# Patient Record
Sex: FEMALE | Race: WHITE | NOT HISPANIC OR LATINO | Employment: FULL TIME | URBAN - METROPOLITAN AREA
[De-identification: names, ages, dates, MRNs, and addresses within clinical notes are randomized per-mention and may not be internally consistent; named-entity substitution may affect disease eponyms.]

---

## 2017-01-12 ENCOUNTER — OFFICE VISIT - HEALTHEAST (OUTPATIENT)
Dept: FAMILY MEDICINE | Facility: CLINIC | Age: 37
End: 2017-01-12

## 2017-01-12 ENCOUNTER — COMMUNICATION - HEALTHEAST (OUTPATIENT)
Dept: FAMILY MEDICINE | Facility: CLINIC | Age: 37
End: 2017-01-12

## 2017-01-12 DIAGNOSIS — F32.2 MAJOR DEPRESSIVE DISORDER, SINGLE EPISODE, SEVERE WITHOUT PSYCHOSIS (H): ICD-10-CM

## 2017-01-12 DIAGNOSIS — F10.10 ALCOHOL ABUSE: ICD-10-CM

## 2017-01-12 DIAGNOSIS — F41.1 GENERALIZED ANXIETY DISORDER: ICD-10-CM

## 2017-01-12 DIAGNOSIS — M77.8 TENDONITIS OF BOTH WRISTS: ICD-10-CM

## 2017-01-12 DIAGNOSIS — R45.851 PASSIVE SUICIDAL IDEATIONS: ICD-10-CM

## 2017-01-12 DIAGNOSIS — F41.0 PANIC DISORDER WITHOUT AGORAPHOBIA: ICD-10-CM

## 2017-01-12 DIAGNOSIS — E11.9 TYPE 2 DIABETES MELLITUS (H): ICD-10-CM

## 2017-01-12 LAB — HBA1C MFR BLD: 6 % (ref 3.5–6)

## 2017-01-12 ASSESSMENT — MIFFLIN-ST. JEOR: SCORE: 1713.89

## 2017-02-09 ENCOUNTER — OFFICE VISIT - HEALTHEAST (OUTPATIENT)
Dept: FAMILY MEDICINE | Facility: CLINIC | Age: 37
End: 2017-02-09

## 2017-02-09 ENCOUNTER — RECORDS - HEALTHEAST (OUTPATIENT)
Dept: GENERAL RADIOLOGY | Facility: CLINIC | Age: 37
End: 2017-02-09

## 2017-02-09 DIAGNOSIS — F41.1 GENERALIZED ANXIETY DISORDER: ICD-10-CM

## 2017-02-09 DIAGNOSIS — F10.10 ALCOHOL ABUSE: ICD-10-CM

## 2017-02-09 DIAGNOSIS — M25.532 PAIN IN LEFT WRIST: ICD-10-CM

## 2017-02-09 DIAGNOSIS — M25.532 LEFT WRIST PAIN: ICD-10-CM

## 2017-02-09 ASSESSMENT — MIFFLIN-ST. JEOR: SCORE: 1715.7

## 2017-04-10 ENCOUNTER — OFFICE VISIT - HEALTHEAST (OUTPATIENT)
Dept: FAMILY MEDICINE | Facility: CLINIC | Age: 37
End: 2017-04-10

## 2017-04-10 ENCOUNTER — COMMUNICATION - HEALTHEAST (OUTPATIENT)
Dept: FAMILY MEDICINE | Facility: CLINIC | Age: 37
End: 2017-04-10

## 2017-04-10 DIAGNOSIS — F41.1 GENERALIZED ANXIETY DISORDER: ICD-10-CM

## 2017-04-10 DIAGNOSIS — F10.10 ALCOHOL ABUSE: ICD-10-CM

## 2017-04-10 ASSESSMENT — MIFFLIN-ST. JEOR: SCORE: 1752.44

## 2017-05-02 ENCOUNTER — COMMUNICATION - HEALTHEAST (OUTPATIENT)
Dept: FAMILY MEDICINE | Facility: CLINIC | Age: 37
End: 2017-05-02

## 2017-07-03 ENCOUNTER — OFFICE VISIT - HEALTHEAST (OUTPATIENT)
Dept: FAMILY MEDICINE | Facility: CLINIC | Age: 37
End: 2017-07-03

## 2017-07-03 DIAGNOSIS — F10.10 ALCOHOL ABUSE: ICD-10-CM

## 2017-07-03 DIAGNOSIS — Z00.00 ROUTINE GENERAL MEDICAL EXAMINATION AT A HEALTH CARE FACILITY: ICD-10-CM

## 2017-07-03 DIAGNOSIS — E66.9 OBESITY, UNSPECIFIED: ICD-10-CM

## 2017-07-03 DIAGNOSIS — E11.9 TYPE 2 DIABETES MELLITUS (H): ICD-10-CM

## 2017-07-03 DIAGNOSIS — E28.2 POLYCYSTIC OVARIES: ICD-10-CM

## 2017-07-03 DIAGNOSIS — F41.1 GENERALIZED ANXIETY DISORDER: ICD-10-CM

## 2017-07-03 DIAGNOSIS — E78.5 HYPERLIPIDEMIA: ICD-10-CM

## 2017-07-03 DIAGNOSIS — E03.9 HYPOTHYROIDISM: ICD-10-CM

## 2017-07-03 LAB
CHOLEST SERPL-MCNC: 257 MG/DL
FASTING STATUS PATIENT QL REPORTED: YES
HBA1C MFR BLD: 5.8 % (ref 3.5–6)
HDLC SERPL-MCNC: 45 MG/DL
LDLC SERPL CALC-MCNC: 166 MG/DL
TRIGL SERPL-MCNC: 229 MG/DL

## 2017-07-03 ASSESSMENT — MIFFLIN-ST. JEOR: SCORE: 1785.45

## 2017-07-06 ENCOUNTER — COMMUNICATION - HEALTHEAST (OUTPATIENT)
Dept: FAMILY MEDICINE | Facility: CLINIC | Age: 37
End: 2017-07-06

## 2017-07-06 DIAGNOSIS — E03.9 HYPOTHYROID: ICD-10-CM

## 2017-07-07 LAB
HPV INTERPRETATION - HISTORICAL: NORMAL
HPV INTERPRETER - HISTORICAL: NORMAL

## 2017-12-28 ENCOUNTER — COMMUNICATION - HEALTHEAST (OUTPATIENT)
Dept: FAMILY MEDICINE | Facility: CLINIC | Age: 37
End: 2017-12-28

## 2018-01-10 ENCOUNTER — COMMUNICATION - HEALTHEAST (OUTPATIENT)
Dept: FAMILY MEDICINE | Facility: CLINIC | Age: 38
End: 2018-01-10

## 2018-01-12 ENCOUNTER — COMMUNICATION - HEALTHEAST (OUTPATIENT)
Dept: FAMILY MEDICINE | Facility: CLINIC | Age: 38
End: 2018-01-12

## 2018-01-12 ENCOUNTER — RECORDS - HEALTHEAST (OUTPATIENT)
Dept: ADMINISTRATIVE | Facility: OTHER | Age: 38
End: 2018-01-12

## 2018-01-22 ENCOUNTER — AMBULATORY - HEALTHEAST (OUTPATIENT)
Dept: LAB | Facility: CLINIC | Age: 38
End: 2018-01-22

## 2018-01-22 DIAGNOSIS — E11.9 TYPE 2 DIABETES MELLITUS (H): ICD-10-CM

## 2018-01-22 DIAGNOSIS — E03.9 HYPOTHYROID: ICD-10-CM

## 2018-01-22 LAB — TSH SERPL DL<=0.005 MIU/L-ACNC: 4.21 UIU/ML (ref 0.3–5)

## 2018-01-23 LAB — HBA1C MFR BLD: 6.2 % (ref 3.5–6)

## 2018-01-24 ENCOUNTER — COMMUNICATION - HEALTHEAST (OUTPATIENT)
Dept: FAMILY MEDICINE | Facility: CLINIC | Age: 38
End: 2018-01-24

## 2018-01-24 DIAGNOSIS — E03.9 HYPOTHYROIDISM: ICD-10-CM

## 2018-01-25 ENCOUNTER — COMMUNICATION - HEALTHEAST (OUTPATIENT)
Dept: FAMILY MEDICINE | Facility: CLINIC | Age: 38
End: 2018-01-25

## 2018-01-26 ENCOUNTER — AMBULATORY - HEALTHEAST (OUTPATIENT)
Dept: NURSING | Facility: CLINIC | Age: 38
End: 2018-01-26

## 2018-05-29 ENCOUNTER — COMMUNICATION - HEALTHEAST (OUTPATIENT)
Dept: FAMILY MEDICINE | Facility: CLINIC | Age: 38
End: 2018-05-29

## 2018-05-29 DIAGNOSIS — E03.9 HYPOTHYROIDISM: ICD-10-CM

## 2018-07-05 ENCOUNTER — COMMUNICATION - HEALTHEAST (OUTPATIENT)
Dept: HEALTH INFORMATION MANAGEMENT | Facility: CLINIC | Age: 38
End: 2018-07-05

## 2018-07-05 ENCOUNTER — COMMUNICATION - HEALTHEAST (OUTPATIENT)
Dept: TELEHEALTH | Facility: CLINIC | Age: 38
End: 2018-07-05

## 2018-07-05 ENCOUNTER — COMMUNICATION - HEALTHEAST (OUTPATIENT)
Dept: FAMILY MEDICINE | Facility: CLINIC | Age: 38
End: 2018-07-05

## 2018-07-09 ENCOUNTER — COMMUNICATION - HEALTHEAST (OUTPATIENT)
Dept: FAMILY MEDICINE | Facility: CLINIC | Age: 38
End: 2018-07-09

## 2018-08-29 ENCOUNTER — COMMUNICATION - HEALTHEAST (OUTPATIENT)
Dept: FAMILY MEDICINE | Facility: CLINIC | Age: 38
End: 2018-08-29

## 2018-08-29 DIAGNOSIS — E03.9 HYPOTHYROIDISM: ICD-10-CM

## 2018-09-17 ENCOUNTER — OFFICE VISIT - HEALTHEAST (OUTPATIENT)
Dept: FAMILY MEDICINE | Facility: CLINIC | Age: 38
End: 2018-09-17

## 2018-09-17 DIAGNOSIS — F10.10 ALCOHOL ABUSE: ICD-10-CM

## 2018-09-17 DIAGNOSIS — E11.9 TYPE 2 DIABETES MELLITUS (H): ICD-10-CM

## 2018-09-17 DIAGNOSIS — F32.1 MAJOR DEPRESSIVE DISORDER, SINGLE EPISODE, MODERATE (H): ICD-10-CM

## 2018-09-17 DIAGNOSIS — E03.9 HYPOTHYROIDISM: ICD-10-CM

## 2018-09-17 DIAGNOSIS — B35.3 TINEA PEDIS OF RIGHT FOOT: ICD-10-CM

## 2018-09-17 DIAGNOSIS — F41.1 GENERALIZED ANXIETY DISORDER: ICD-10-CM

## 2018-09-17 LAB
ALBUMIN SERPL-MCNC: 3.8 G/DL (ref 3.5–5)
ALP SERPL-CCNC: 53 U/L (ref 45–120)
ALT SERPL W P-5'-P-CCNC: 96 U/L (ref 0–45)
ANION GAP SERPL CALCULATED.3IONS-SCNC: 9 MMOL/L (ref 5–18)
AST SERPL W P-5'-P-CCNC: 31 U/L (ref 0–40)
BILIRUB SERPL-MCNC: 0.5 MG/DL (ref 0–1)
BUN SERPL-MCNC: 14 MG/DL (ref 8–22)
CALCIUM SERPL-MCNC: 9.5 MG/DL (ref 8.5–10.5)
CHLORIDE BLD-SCNC: 103 MMOL/L (ref 98–107)
CHOLEST SERPL-MCNC: 287 MG/DL
CO2 SERPL-SCNC: 24 MMOL/L (ref 22–31)
CREAT SERPL-MCNC: 0.82 MG/DL (ref 0.6–1.1)
FASTING STATUS PATIENT QL REPORTED: ABNORMAL
GFR SERPL CREATININE-BSD FRML MDRD: >60 ML/MIN/1.73M2
GLUCOSE BLD-MCNC: 233 MG/DL (ref 70–125)
HBA1C MFR BLD: 7.6 % (ref 3.5–6)
HDLC SERPL-MCNC: 45 MG/DL
LDLC SERPL CALC-MCNC: 174 MG/DL
POTASSIUM BLD-SCNC: 4.9 MMOL/L (ref 3.5–5)
PROT SERPL-MCNC: 6.8 G/DL (ref 6–8)
SODIUM SERPL-SCNC: 136 MMOL/L (ref 136–145)
TRIGL SERPL-MCNC: 342 MG/DL
TSH SERPL DL<=0.005 MIU/L-ACNC: 2.53 UIU/ML (ref 0.3–5)

## 2018-09-17 ASSESSMENT — MIFFLIN-ST. JEOR: SCORE: 1794.97

## 2018-09-18 ENCOUNTER — COMMUNICATION - HEALTHEAST (OUTPATIENT)
Dept: FAMILY MEDICINE | Facility: CLINIC | Age: 38
End: 2018-09-18

## 2018-09-20 ENCOUNTER — COMMUNICATION - HEALTHEAST (OUTPATIENT)
Dept: FAMILY MEDICINE | Facility: CLINIC | Age: 38
End: 2018-09-20

## 2018-09-23 ENCOUNTER — AMBULATORY - HEALTHEAST (OUTPATIENT)
Dept: FAMILY MEDICINE | Facility: CLINIC | Age: 38
End: 2018-09-23

## 2018-10-30 ENCOUNTER — COMMUNICATION - HEALTHEAST (OUTPATIENT)
Dept: FAMILY MEDICINE | Facility: CLINIC | Age: 38
End: 2018-10-30

## 2018-10-30 DIAGNOSIS — E03.9 HYPOTHYROIDISM: ICD-10-CM

## 2018-11-12 ENCOUNTER — OFFICE VISIT - HEALTHEAST (OUTPATIENT)
Dept: FAMILY MEDICINE | Facility: CLINIC | Age: 38
End: 2018-11-12

## 2018-11-12 DIAGNOSIS — E11.9 TYPE 2 DIABETES MELLITUS WITHOUT COMPLICATION, WITHOUT LONG-TERM CURRENT USE OF INSULIN (H): ICD-10-CM

## 2018-11-12 DIAGNOSIS — E66.01 MORBID OBESITY (H): ICD-10-CM

## 2018-11-12 DIAGNOSIS — F32.1 MAJOR DEPRESSIVE DISORDER, SINGLE EPISODE, MODERATE (H): ICD-10-CM

## 2018-11-12 DIAGNOSIS — J01.91 ACUTE RECURRENT SINUSITIS, UNSPECIFIED LOCATION: ICD-10-CM

## 2018-11-12 DIAGNOSIS — F41.1 GENERALIZED ANXIETY DISORDER: ICD-10-CM

## 2018-11-12 ASSESSMENT — MIFFLIN-ST. JEOR: SCORE: 1738.39

## 2019-06-04 ENCOUNTER — COMMUNICATION - HEALTHEAST (OUTPATIENT)
Dept: FAMILY MEDICINE | Facility: CLINIC | Age: 39
End: 2019-06-04

## 2019-10-14 ENCOUNTER — COMMUNICATION - HEALTHEAST (OUTPATIENT)
Dept: FAMILY MEDICINE | Facility: CLINIC | Age: 39
End: 2019-10-14

## 2019-10-22 ENCOUNTER — COMMUNICATION - HEALTHEAST (OUTPATIENT)
Dept: CARE COORDINATION | Facility: CLINIC | Age: 39
End: 2019-10-22

## 2019-10-25 ENCOUNTER — OFFICE VISIT - HEALTHEAST (OUTPATIENT)
Dept: FAMILY MEDICINE | Facility: CLINIC | Age: 39
End: 2019-10-25

## 2019-10-25 DIAGNOSIS — R03.0 ELEVATED BP WITHOUT DIAGNOSIS OF HYPERTENSION: ICD-10-CM

## 2019-10-25 DIAGNOSIS — Z09 HOSPITAL DISCHARGE FOLLOW-UP: ICD-10-CM

## 2019-10-25 DIAGNOSIS — A41.9 SEVERE SEPSIS (H): ICD-10-CM

## 2019-10-25 DIAGNOSIS — R65.20 SEVERE SEPSIS (H): ICD-10-CM

## 2019-10-25 DIAGNOSIS — E11.9 TYPE 2 DIABETES MELLITUS WITHOUT COMPLICATION, WITHOUT LONG-TERM CURRENT USE OF INSULIN (H): ICD-10-CM

## 2019-10-25 DIAGNOSIS — D72.829 LEUKOCYTOSIS, UNSPECIFIED TYPE: ICD-10-CM

## 2019-10-25 ASSESSMENT — MIFFLIN-ST. JEOR: SCORE: 1775.36

## 2019-10-25 ASSESSMENT — PATIENT HEALTH QUESTIONNAIRE - PHQ9: SUM OF ALL RESPONSES TO PHQ QUESTIONS 1-9: 9

## 2020-02-03 ENCOUNTER — OFFICE VISIT - HEALTHEAST (OUTPATIENT)
Dept: FAMILY MEDICINE | Facility: CLINIC | Age: 40
End: 2020-02-03

## 2020-02-03 DIAGNOSIS — F32.1 MAJOR DEPRESSIVE DISORDER, SINGLE EPISODE, MODERATE (H): ICD-10-CM

## 2020-02-03 DIAGNOSIS — E06.3 HYPOTHYROIDISM DUE TO HASHIMOTO'S THYROIDITIS: ICD-10-CM

## 2020-02-03 DIAGNOSIS — R41.3 MEMORY LOSS: ICD-10-CM

## 2020-02-03 DIAGNOSIS — F41.1 GENERALIZED ANXIETY DISORDER: ICD-10-CM

## 2020-02-03 DIAGNOSIS — Z00.00 ROUTINE GENERAL MEDICAL EXAMINATION AT A HEALTH CARE FACILITY: ICD-10-CM

## 2020-02-03 DIAGNOSIS — F10.10 ALCOHOL ABUSE: ICD-10-CM

## 2020-02-03 DIAGNOSIS — E66.01 MORBID OBESITY (H): ICD-10-CM

## 2020-02-03 DIAGNOSIS — E87.1 HYPONATREMIA: ICD-10-CM

## 2020-02-03 DIAGNOSIS — E11.9 TYPE 2 DIABETES MELLITUS WITHOUT COMPLICATION, WITHOUT LONG-TERM CURRENT USE OF INSULIN (H): ICD-10-CM

## 2020-02-03 DIAGNOSIS — B35.3 TINEA PEDIS OF RIGHT FOOT: ICD-10-CM

## 2020-02-03 DIAGNOSIS — E03.9 HYPOTHYROIDISM: ICD-10-CM

## 2020-02-03 DIAGNOSIS — E78.5 HYPERLIPIDEMIA, UNSPECIFIED HYPERLIPIDEMIA TYPE: ICD-10-CM

## 2020-02-03 DIAGNOSIS — E83.42 HYPOMAGNESEMIA: ICD-10-CM

## 2020-02-03 DIAGNOSIS — G44.86 CERVICOGENIC HEADACHE: ICD-10-CM

## 2020-02-03 LAB
ALBUMIN SERPL-MCNC: 4.2 G/DL (ref 3.5–5)
ALP SERPL-CCNC: 54 U/L (ref 45–120)
ALT SERPL W P-5'-P-CCNC: 84 U/L (ref 0–45)
ANION GAP SERPL CALCULATED.3IONS-SCNC: 7 MMOL/L (ref 5–18)
AST SERPL W P-5'-P-CCNC: 34 U/L (ref 0–40)
BILIRUB SERPL-MCNC: 0.7 MG/DL (ref 0–1)
BUN SERPL-MCNC: 13 MG/DL (ref 8–22)
CALCIUM SERPL-MCNC: 9.7 MG/DL (ref 8.5–10.5)
CHLORIDE BLD-SCNC: 101 MMOL/L (ref 98–107)
CHOLEST SERPL-MCNC: 280 MG/DL
CO2 SERPL-SCNC: 26 MMOL/L (ref 22–31)
CREAT SERPL-MCNC: 0.95 MG/DL (ref 0.6–1.1)
FASTING STATUS PATIENT QL REPORTED: YES
GFR SERPL CREATININE-BSD FRML MDRD: >60 ML/MIN/1.73M2
GLUCOSE BLD-MCNC: 269 MG/DL (ref 70–125)
HBA1C MFR BLD: 8.1 % (ref 3.5–6)
HDLC SERPL-MCNC: 50 MG/DL
HIV 1+2 AB+HIV1 P24 AG SERPL QL IA: NEGATIVE
LDLC SERPL CALC-MCNC: 204 MG/DL
MAGNESIUM SERPL-MCNC: 1.7 MG/DL (ref 1.8–2.6)
POTASSIUM BLD-SCNC: 4.7 MMOL/L (ref 3.5–5)
PROT SERPL-MCNC: 7.1 G/DL (ref 6–8)
SODIUM SERPL-SCNC: 134 MMOL/L (ref 136–145)
TRIGL SERPL-MCNC: 129 MG/DL

## 2020-02-03 ASSESSMENT — ANXIETY QUESTIONNAIRES
6. BECOMING EASILY ANNOYED OR IRRITABLE: MORE THAN HALF THE DAYS
GAD7 TOTAL SCORE: 8
7. FEELING AFRAID AS IF SOMETHING AWFUL MIGHT HAPPEN: SEVERAL DAYS
3. WORRYING TOO MUCH ABOUT DIFFERENT THINGS: SEVERAL DAYS
5. BEING SO RESTLESS THAT IT IS HARD TO SIT STILL: SEVERAL DAYS
1. FEELING NERVOUS, ANXIOUS, OR ON EDGE: SEVERAL DAYS
2. NOT BEING ABLE TO STOP OR CONTROL WORRYING: SEVERAL DAYS
4. TROUBLE RELAXING: SEVERAL DAYS

## 2020-02-03 ASSESSMENT — PATIENT HEALTH QUESTIONNAIRE - PHQ9: SUM OF ALL RESPONSES TO PHQ QUESTIONS 1-9: 9

## 2020-02-03 ASSESSMENT — MIFFLIN-ST. JEOR: SCORE: 1750.18

## 2020-02-21 ENCOUNTER — COMMUNICATION - HEALTHEAST (OUTPATIENT)
Dept: FAMILY MEDICINE | Facility: CLINIC | Age: 40
End: 2020-02-21

## 2020-03-02 ENCOUNTER — COMMUNICATION - HEALTHEAST (OUTPATIENT)
Dept: FAMILY MEDICINE | Facility: CLINIC | Age: 40
End: 2020-03-02

## 2020-06-18 ENCOUNTER — COMMUNICATION - HEALTHEAST (OUTPATIENT)
Dept: FAMILY MEDICINE | Facility: CLINIC | Age: 40
End: 2020-06-18

## 2020-06-18 DIAGNOSIS — E11.9 TYPE 2 DIABETES MELLITUS WITHOUT COMPLICATION, WITHOUT LONG-TERM CURRENT USE OF INSULIN (H): ICD-10-CM

## 2020-06-24 ENCOUNTER — OFFICE VISIT - HEALTHEAST (OUTPATIENT)
Dept: FAMILY MEDICINE | Facility: CLINIC | Age: 40
End: 2020-06-24

## 2020-06-24 DIAGNOSIS — L03.116 CELLULITIS OF LEFT LOWER EXTREMITY: ICD-10-CM

## 2020-06-24 DIAGNOSIS — Z09 HOSPITAL DISCHARGE FOLLOW-UP: ICD-10-CM

## 2020-06-24 DIAGNOSIS — E11.9 TYPE 2 DIABETES MELLITUS WITHOUT COMPLICATION, WITHOUT LONG-TERM CURRENT USE OF INSULIN (H): ICD-10-CM

## 2020-06-26 ENCOUNTER — AMBULATORY - HEALTHEAST (OUTPATIENT)
Dept: EDUCATION SERVICES | Facility: CLINIC | Age: 40
End: 2020-06-26

## 2020-06-26 DIAGNOSIS — E11.9 TYPE 2 DIABETES MELLITUS WITHOUT COMPLICATION, WITHOUT LONG-TERM CURRENT USE OF INSULIN (H): ICD-10-CM

## 2020-06-30 ENCOUNTER — AMBULATORY - HEALTHEAST (OUTPATIENT)
Dept: LAB | Facility: CLINIC | Age: 40
End: 2020-06-30

## 2020-06-30 DIAGNOSIS — L03.116 CELLULITIS OF LEFT LOWER EXTREMITY: ICD-10-CM

## 2020-06-30 DIAGNOSIS — Z09 HOSPITAL DISCHARGE FOLLOW-UP: ICD-10-CM

## 2020-06-30 DIAGNOSIS — E11.9 TYPE 2 DIABETES MELLITUS WITHOUT COMPLICATION, WITHOUT LONG-TERM CURRENT USE OF INSULIN (H): ICD-10-CM

## 2020-06-30 LAB
ALBUMIN SERPL-MCNC: 4.2 G/DL (ref 3.5–5)
ALP SERPL-CCNC: 52 U/L (ref 45–120)
ALT SERPL W P-5'-P-CCNC: 85 U/L (ref 0–45)
ANION GAP SERPL CALCULATED.3IONS-SCNC: 10 MMOL/L (ref 5–18)
AST SERPL W P-5'-P-CCNC: 36 U/L (ref 0–40)
BILIRUB SERPL-MCNC: 0.9 MG/DL (ref 0–1)
BUN SERPL-MCNC: 11 MG/DL (ref 8–22)
CALCIUM SERPL-MCNC: 9.4 MG/DL (ref 8.5–10.5)
CHLORIDE BLD-SCNC: 101 MMOL/L (ref 98–107)
CO2 SERPL-SCNC: 26 MMOL/L (ref 22–31)
CREAT SERPL-MCNC: 0.91 MG/DL (ref 0.6–1.1)
CREAT UR-MCNC: 74.8 MG/DL
ERYTHROCYTE [DISTWIDTH] IN BLOOD BY AUTOMATED COUNT: 12.3 % (ref 11–14.5)
GFR SERPL CREATININE-BSD FRML MDRD: >60 ML/MIN/1.73M2
GLUCOSE BLD-MCNC: 150 MG/DL (ref 70–125)
HCT VFR BLD AUTO: 34.5 % (ref 35–47)
HGB BLD-MCNC: 12.4 G/DL (ref 12–16)
MCH RBC QN AUTO: 33.9 PG (ref 27–34)
MCHC RBC AUTO-ENTMCNC: 36.1 G/DL (ref 32–36)
MCV RBC AUTO: 94 FL (ref 80–100)
MICROALBUMIN UR-MCNC: 1.59 MG/DL (ref 0–1.99)
MICROALBUMIN/CREAT UR: 21.3 MG/G
PLATELET # BLD AUTO: 252 THOU/UL (ref 140–440)
PMV BLD AUTO: 8.3 FL (ref 7–10)
POTASSIUM BLD-SCNC: 4.3 MMOL/L (ref 3.5–5)
PROT SERPL-MCNC: 7.2 G/DL (ref 6–8)
RBC # BLD AUTO: 3.67 MILL/UL (ref 3.8–5.4)
SODIUM SERPL-SCNC: 137 MMOL/L (ref 136–145)
WBC: 6.3 THOU/UL (ref 4–11)

## 2020-08-07 ENCOUNTER — AMBULATORY - HEALTHEAST (OUTPATIENT)
Dept: EDUCATION SERVICES | Facility: CLINIC | Age: 40
End: 2020-08-07

## 2020-08-07 DIAGNOSIS — E11.9 TYPE 2 DIABETES MELLITUS WITHOUT COMPLICATION, WITHOUT LONG-TERM CURRENT USE OF INSULIN (H): ICD-10-CM

## 2020-09-25 ENCOUNTER — OFFICE VISIT - HEALTHEAST (OUTPATIENT)
Dept: FAMILY MEDICINE | Facility: CLINIC | Age: 40
End: 2020-09-25

## 2020-09-25 DIAGNOSIS — B02.9 HERPES ZOSTER WITHOUT COMPLICATION: ICD-10-CM

## 2020-09-25 DIAGNOSIS — E11.9 TYPE 2 DIABETES MELLITUS WITHOUT COMPLICATION, WITHOUT LONG-TERM CURRENT USE OF INSULIN (H): ICD-10-CM

## 2020-10-09 ENCOUNTER — OFFICE VISIT - HEALTHEAST (OUTPATIENT)
Dept: FAMILY MEDICINE | Facility: CLINIC | Age: 40
End: 2020-10-09

## 2020-10-09 DIAGNOSIS — B02.9 HERPES ZOSTER WITHOUT COMPLICATION: ICD-10-CM

## 2020-10-09 ASSESSMENT — ANXIETY QUESTIONNAIRES
GAD7 TOTAL SCORE: 16
6. BECOMING EASILY ANNOYED OR IRRITABLE: NEARLY EVERY DAY
1. FEELING NERVOUS, ANXIOUS, OR ON EDGE: MORE THAN HALF THE DAYS
3. WORRYING TOO MUCH ABOUT DIFFERENT THINGS: MORE THAN HALF THE DAYS
4. TROUBLE RELAXING: NEARLY EVERY DAY
5. BEING SO RESTLESS THAT IT IS HARD TO SIT STILL: NEARLY EVERY DAY
7. FEELING AFRAID AS IF SOMETHING AWFUL MIGHT HAPPEN: MORE THAN HALF THE DAYS
2. NOT BEING ABLE TO STOP OR CONTROL WORRYING: SEVERAL DAYS

## 2020-10-09 ASSESSMENT — PATIENT HEALTH QUESTIONNAIRE - PHQ9: SUM OF ALL RESPONSES TO PHQ QUESTIONS 1-9: 8

## 2020-10-20 ENCOUNTER — OFFICE VISIT - HEALTHEAST (OUTPATIENT)
Dept: FAMILY MEDICINE | Facility: CLINIC | Age: 40
End: 2020-10-20

## 2020-10-20 DIAGNOSIS — E11.9 TYPE 2 DIABETES MELLITUS WITHOUT COMPLICATION, WITHOUT LONG-TERM CURRENT USE OF INSULIN (H): ICD-10-CM

## 2020-10-20 DIAGNOSIS — E06.3 HYPOTHYROIDISM DUE TO HASHIMOTO'S THYROIDITIS: ICD-10-CM

## 2020-10-20 DIAGNOSIS — E78.5 HYPERLIPIDEMIA, UNSPECIFIED HYPERLIPIDEMIA TYPE: ICD-10-CM

## 2020-10-20 DIAGNOSIS — E66.01 MORBID OBESITY (H): ICD-10-CM

## 2020-10-20 LAB
ALBUMIN SERPL-MCNC: 4.3 G/DL (ref 3.5–5)
ALP SERPL-CCNC: 43 U/L (ref 45–120)
ALT SERPL W P-5'-P-CCNC: 36 U/L (ref 0–45)
ANION GAP SERPL CALCULATED.3IONS-SCNC: 7 MMOL/L (ref 5–18)
AST SERPL W P-5'-P-CCNC: 16 U/L (ref 0–40)
BILIRUB SERPL-MCNC: 0.8 MG/DL (ref 0–1)
BUN SERPL-MCNC: 20 MG/DL (ref 8–22)
CALCIUM SERPL-MCNC: 9.7 MG/DL (ref 8.5–10.5)
CHLORIDE BLD-SCNC: 105 MMOL/L (ref 98–107)
CHOLEST SERPL-MCNC: 138 MG/DL
CO2 SERPL-SCNC: 27 MMOL/L (ref 22–31)
CREAT SERPL-MCNC: 0.83 MG/DL (ref 0.6–1.1)
FASTING STATUS PATIENT QL REPORTED: YES
GFR SERPL CREATININE-BSD FRML MDRD: >60 ML/MIN/1.73M2
GLUCOSE BLD-MCNC: 132 MG/DL (ref 70–125)
HBA1C MFR BLD: 5.8 %
HDLC SERPL-MCNC: 47 MG/DL
LDLC SERPL CALC-MCNC: 72 MG/DL
POTASSIUM BLD-SCNC: 4.6 MMOL/L (ref 3.5–5)
PROT SERPL-MCNC: 7.1 G/DL (ref 6–8)
SODIUM SERPL-SCNC: 139 MMOL/L (ref 136–145)
TRIGL SERPL-MCNC: 94 MG/DL
TSH SERPL DL<=0.005 MIU/L-ACNC: 0.9 UIU/ML (ref 0.3–5)

## 2020-10-20 ASSESSMENT — PATIENT HEALTH QUESTIONNAIRE - PHQ9: SUM OF ALL RESPONSES TO PHQ QUESTIONS 1-9: 10

## 2021-01-18 ENCOUNTER — COMMUNICATION - HEALTHEAST (OUTPATIENT)
Dept: FAMILY MEDICINE | Facility: CLINIC | Age: 41
End: 2021-01-18

## 2021-01-18 DIAGNOSIS — E03.9 HYPOTHYROIDISM: ICD-10-CM

## 2021-01-20 ENCOUNTER — OFFICE VISIT - HEALTHEAST (OUTPATIENT)
Dept: CARDIOLOGY | Facility: CLINIC | Age: 41
End: 2021-01-20

## 2021-01-20 DIAGNOSIS — Z00.6 EXAMINATION OF PARTICIPANT OR CONTROL IN CLINICAL RESEARCH: ICD-10-CM

## 2021-01-20 RX ORDER — ZINC GLUCONATE 50 MG
50 TABLET ORAL EVERY OTHER DAY
Status: SHIPPED | COMMUNITY
Start: 2021-01-20

## 2021-03-08 ENCOUNTER — COMMUNICATION - HEALTHEAST (OUTPATIENT)
Dept: FAMILY MEDICINE | Facility: CLINIC | Age: 41
End: 2021-03-08

## 2021-03-09 ENCOUNTER — COMMUNICATION - HEALTHEAST (OUTPATIENT)
Dept: FAMILY MEDICINE | Facility: CLINIC | Age: 41
End: 2021-03-09

## 2021-03-23 ENCOUNTER — COMMUNICATION - HEALTHEAST (OUTPATIENT)
Dept: FAMILY MEDICINE | Facility: CLINIC | Age: 41
End: 2021-03-23

## 2021-03-23 DIAGNOSIS — E11.9 TYPE 2 DIABETES MELLITUS WITHOUT COMPLICATION, WITHOUT LONG-TERM CURRENT USE OF INSULIN (H): ICD-10-CM

## 2021-03-23 DIAGNOSIS — E78.5 HYPERLIPIDEMIA, UNSPECIFIED HYPERLIPIDEMIA TYPE: ICD-10-CM

## 2021-03-24 RX ORDER — ATORVASTATIN CALCIUM 20 MG/1
20 TABLET, FILM COATED ORAL DAILY
Qty: 90 TABLET | Refills: 2 | Status: SHIPPED | OUTPATIENT
Start: 2021-03-24 | End: 2022-04-04

## 2021-03-30 ENCOUNTER — COMMUNICATION - HEALTHEAST (OUTPATIENT)
Dept: FAMILY MEDICINE | Facility: CLINIC | Age: 41
End: 2021-03-30

## 2021-03-30 DIAGNOSIS — E11.9 TYPE 2 DIABETES MELLITUS WITHOUT COMPLICATION, WITHOUT LONG-TERM CURRENT USE OF INSULIN (H): ICD-10-CM

## 2021-03-30 DIAGNOSIS — E66.01 MORBID OBESITY (H): ICD-10-CM

## 2021-04-09 ENCOUNTER — AMBULATORY - HEALTHEAST (OUTPATIENT)
Dept: NURSING | Facility: CLINIC | Age: 41
End: 2021-04-09

## 2021-04-29 ENCOUNTER — COMMUNICATION - HEALTHEAST (OUTPATIENT)
Dept: SCHEDULING | Facility: CLINIC | Age: 41
End: 2021-04-29

## 2021-05-17 ENCOUNTER — OFFICE VISIT - HEALTHEAST (OUTPATIENT)
Dept: FAMILY MEDICINE | Facility: CLINIC | Age: 41
End: 2021-05-17

## 2021-05-17 DIAGNOSIS — E66.01 MORBID OBESITY (H): ICD-10-CM

## 2021-05-17 DIAGNOSIS — R09.81 CHRONIC NASAL CONGESTION: ICD-10-CM

## 2021-05-17 DIAGNOSIS — Z80.0 FAMILY HISTORY OF COLON CANCER IN FATHER: ICD-10-CM

## 2021-05-17 DIAGNOSIS — T50.Z95D ADVERSE EFFECT OF VACCINE, SUBSEQUENT ENCOUNTER: ICD-10-CM

## 2021-05-17 DIAGNOSIS — E06.3 HYPOTHYROIDISM DUE TO HASHIMOTO'S THYROIDITIS: ICD-10-CM

## 2021-05-17 DIAGNOSIS — F41.1 GENERALIZED ANXIETY DISORDER: ICD-10-CM

## 2021-05-17 DIAGNOSIS — Z00.00 ROUTINE GENERAL MEDICAL EXAMINATION AT A HEALTH CARE FACILITY: ICD-10-CM

## 2021-05-17 DIAGNOSIS — R07.89 ATYPICAL CHEST PAIN: ICD-10-CM

## 2021-05-17 DIAGNOSIS — F32.1 MAJOR DEPRESSIVE DISORDER, SINGLE EPISODE, MODERATE (H): ICD-10-CM

## 2021-05-17 DIAGNOSIS — E11.9 TYPE 2 DIABETES MELLITUS WITHOUT COMPLICATION, WITHOUT LONG-TERM CURRENT USE OF INSULIN (H): ICD-10-CM

## 2021-05-17 DIAGNOSIS — E28.2 POLYCYSTIC OVARIES: ICD-10-CM

## 2021-05-17 DIAGNOSIS — F10.10 ALCOHOL ABUSE: ICD-10-CM

## 2021-05-17 DIAGNOSIS — E78.5 HYPERLIPIDEMIA, UNSPECIFIED HYPERLIPIDEMIA TYPE: ICD-10-CM

## 2021-05-17 LAB
ALBUMIN SERPL-MCNC: 4.1 G/DL (ref 3.5–5)
ALP SERPL-CCNC: 46 U/L (ref 45–120)
ALT SERPL W P-5'-P-CCNC: 25 U/L (ref 0–45)
ANION GAP SERPL CALCULATED.3IONS-SCNC: 13 MMOL/L (ref 5–18)
AST SERPL W P-5'-P-CCNC: 14 U/L (ref 0–40)
ATRIAL RATE - MUSE: 76 BPM
BILIRUB SERPL-MCNC: 1.1 MG/DL (ref 0–1)
BUN SERPL-MCNC: 14 MG/DL (ref 8–22)
CALCIUM SERPL-MCNC: 9.3 MG/DL (ref 8.5–10.5)
CHLORIDE BLD-SCNC: 104 MMOL/L (ref 98–107)
CHOLEST SERPL-MCNC: 157 MG/DL
CO2 SERPL-SCNC: 23 MMOL/L (ref 22–31)
CREAT SERPL-MCNC: 0.87 MG/DL (ref 0.6–1.1)
CREAT UR-MCNC: 215.8 MG/DL
DIASTOLIC BLOOD PRESSURE - MUSE: NORMAL
FASTING STATUS PATIENT QL REPORTED: YES
GFR SERPL CREATININE-BSD FRML MDRD: >60 ML/MIN/1.73M2
GLUCOSE BLD-MCNC: 121 MG/DL (ref 70–125)
HBA1C MFR BLD: 5.3 %
HDLC SERPL-MCNC: 56 MG/DL
INTERPRETATION ECG - MUSE: NORMAL
LDLC SERPL CALC-MCNC: 85 MG/DL
MICROALBUMIN UR-MCNC: 1.81 MG/DL (ref 0–1.99)
MICROALBUMIN/CREAT UR: 8.4 MG/G
P AXIS - MUSE: 37 DEGREES
POTASSIUM BLD-SCNC: 4.4 MMOL/L (ref 3.5–5)
PR INTERVAL - MUSE: 162 MS
PROT SERPL-MCNC: 6.8 G/DL (ref 6–8)
QRS DURATION - MUSE: 80 MS
QT - MUSE: 382 MS
QTC - MUSE: 429 MS
R AXIS - MUSE: -2 DEGREES
SODIUM SERPL-SCNC: 140 MMOL/L (ref 136–145)
SYSTOLIC BLOOD PRESSURE - MUSE: NORMAL
T AXIS - MUSE: 18 DEGREES
T4 FREE SERPL-MCNC: 1 NG/DL (ref 0.7–1.8)
TRIGL SERPL-MCNC: 80 MG/DL
TSH SERPL DL<=0.005 MIU/L-ACNC: 6.15 UIU/ML (ref 0.3–5)
VENTRICULAR RATE- MUSE: 76 BPM

## 2021-05-17 RX ORDER — METFORMIN HYDROCHLORIDE EXTENDED-RELEASE TABLETS 500 MG/1
1000 TABLET, FILM COATED, EXTENDED RELEASE ORAL
Qty: 360 TABLET | Refills: 2 | Status: SHIPPED | OUTPATIENT
Start: 2021-05-17 | End: 2022-04-04

## 2021-05-17 ASSESSMENT — MIFFLIN-ST. JEOR: SCORE: 1595.73

## 2021-05-17 ASSESSMENT — PATIENT HEALTH QUESTIONNAIRE - PHQ9: SUM OF ALL RESPONSES TO PHQ QUESTIONS 1-9: 8

## 2021-05-18 LAB — HCV AB SERPL QL IA: NEGATIVE

## 2021-05-21 ENCOUNTER — HOSPITAL ENCOUNTER (OUTPATIENT)
Dept: ULTRASOUND IMAGING | Facility: HOSPITAL | Age: 41
Discharge: HOME | End: 2021-05-21
Attending: FAMILY MEDICINE

## 2021-05-21 DIAGNOSIS — R07.89 ATYPICAL CHEST PAIN: ICD-10-CM

## 2021-05-22 ENCOUNTER — AMBULATORY - HEALTHEAST (OUTPATIENT)
Dept: FAMILY MEDICINE | Facility: CLINIC | Age: 41
End: 2021-05-22

## 2021-05-22 DIAGNOSIS — E03.9 HYPOTHYROIDISM: ICD-10-CM

## 2021-05-22 RX ORDER — LEVOTHYROXINE SODIUM 150 UG/1
150 TABLET ORAL DAILY
Qty: 90 TABLET | Refills: 1 | Status: SHIPPED | OUTPATIENT
Start: 2021-05-22 | End: 2022-07-27

## 2021-05-26 ASSESSMENT — PATIENT HEALTH QUESTIONNAIRE - PHQ9: SUM OF ALL RESPONSES TO PHQ QUESTIONS 1-9: 9

## 2021-05-27 VITALS
HEIGHT: 67 IN | HEART RATE: 77 BPM | TEMPERATURE: 98.1 F | RESPIRATION RATE: 20 BRPM | DIASTOLIC BLOOD PRESSURE: 80 MMHG | BODY MASS INDEX: 31.42 KG/M2 | OXYGEN SATURATION: 98 % | WEIGHT: 200.2 LBS | SYSTOLIC BLOOD PRESSURE: 130 MMHG

## 2021-05-27 ASSESSMENT — PATIENT HEALTH QUESTIONNAIRE - PHQ9
SUM OF ALL RESPONSES TO PHQ QUESTIONS 1-9: 8
SUM OF ALL RESPONSES TO PHQ QUESTIONS 1-9: 10
SUM OF ALL RESPONSES TO PHQ QUESTIONS 1-9: 8
SUM OF ALL RESPONSES TO PHQ QUESTIONS 1-9: 9

## 2021-05-28 ASSESSMENT — ANXIETY QUESTIONNAIRES
GAD7 TOTAL SCORE: 8
GAD7 TOTAL SCORE: 16

## 2021-05-30 ENCOUNTER — RECORDS - HEALTHEAST (OUTPATIENT)
Dept: ADMINISTRATIVE | Facility: CLINIC | Age: 41
End: 2021-05-30

## 2021-05-30 VITALS — WEIGHT: 228 LBS | HEIGHT: 67 IN | BODY MASS INDEX: 35.79 KG/M2

## 2021-05-30 VITALS — BODY MASS INDEX: 34.08 KG/M2 | HEIGHT: 68 IN | WEIGHT: 224.9 LBS

## 2021-05-30 VITALS — BODY MASS INDEX: 35.31 KG/M2 | HEIGHT: 68 IN | WEIGHT: 233 LBS

## 2021-05-31 VITALS — HEIGHT: 67 IN | WEIGHT: 242.9 LBS | BODY MASS INDEX: 38.12 KG/M2

## 2021-06-02 VITALS — BODY MASS INDEX: 38.45 KG/M2 | WEIGHT: 245 LBS | HEIGHT: 67 IN

## 2021-06-02 VITALS — WEIGHT: 233.4 LBS | HEIGHT: 67 IN | BODY MASS INDEX: 36.63 KG/M2

## 2021-06-02 NOTE — PROGRESS NOTES
Hospital Follow-up Visit:    Assessment/Plan:     1. Hospital discharge follow-up  2. Severe sepsis (H)  3. Leukocytosis, unspecified type  Hospital discharge follow-up visit completed today.  Reviewed pertinent notes, labs with the patient.  She has shown significant improvement since discharge.  She is tolerating clindamycin well despite some mild GI side effects.  No sign of worsening infection on exam today.  I advised that she finish out course of antibiotic.  She will return to clinic next week for her scheduled annual exam and will reassess at that point.  She should notify us with any new or worsening symptoms in the meantime.    4. Type 2 diabetes mellitus without complication, without long-term current use of insulin (H)  Is been over a year since patient has had diabetes check and routine health maintenance. She declines checking hemoglobin A1c today or other lab work and prefers to wait until next week at her scheduled annual exam.  She remains off of medications at this time.  We discussed the importance of checking hemoglobin A1c given history and recent hospitalization.    5.  Elevated blood pressure without diagnosis of hypertension.  Blood pressure elevated today at 150/80, the same on recheck.  Patient states her blood pressure is always elevated in clinic and does not believe that it is elevated outside the office.  Will recheck at her visit in 1 week and consider implementing medications if blood pressure still elevated at that time.    Subjective:     Dhaval Austin is a 39 y.o. female who presents for a hospital discharge follow up.  Patient was hospitalized from 10/19/2019 through 10/20/2019 at [facility] with sepsis secondary to cellulitis.  Past medical history includes type 2 diabetes and recurrent cellulitis, depression, anxiety, hypothyroidism, PCOS and hyperlipidemia.  Patient was found to be septic on exam, was admitted and started on IV Zosyn and IV fluids.  She had rapid  "improvement and was discharged home on oral clindamycin.  No changes were made to her medications.  Today, patient reports that she is doing significantly better since discharge.  She has some mild GI upset with clindamycin if she takes on empty stomach, otherwise no adverse reactions that concern her today.  She denies any fevers, chills or other systemic symptoms.  Redness on foot is markedly improved.  It is just mildly tender to touch at this point.  Swelling has also improved.  Patient has her annual exam scheduled next week and prefers to wait with any blood work until her visit at that time.  It has been over a year since she has been seen in clinic for diabetes check and routine health maintenance.  She is aware of this.  She does not have any additional concerns today.      Hospital/Nursing Home/ Rehab Facility: [facility]  Date of Admission: 1019/19  Date of Discharge:10/20/19  Reason(s) for Admission: Sepsis due to cellulitis            Do you have any problems taking your medication regularly?  None       Have you had any changes in your medication since discharge? None       Have you had any difficulty following your discharge or treatment plan?  No    Summary of hospitalization:  Hospital discharge summary reviewed  Diagnostic Tests/Treatments reviewed.  Follow up needed: None  Other Healthcare Providers Involved in Patient's Care: Patient Care Team:  Ramya Seaman MD as PCP - General  Ramya Seaman MD as Assigned PCP      Update since discharge: {improved   Information from family, SNF, care coordination: n/a     Post Discharge Medication Reconciliation: discharge medications reconciled, continue medications without change  Plan of care communicated with: patient    Objective:     Vitals:    10/25/19 0719 10/25/19 0754   BP: 150/88 150/86   Pulse: 80    Temp: 98.8  F (37.1  C)    Weight: (!) 239 lb 12.8 oz (108.8 kg)    Height: 5' 7\" (1.702 m)          Physical Exam:    General " Appearance:  Alert, cooperative, no distress, appears stated age   Lungs:   Clear to auscultation bilaterally, respirations unlabored.    Heart:  Regular rate and rhythm, S1, S2 normal, no murmur, rub or gallop   Extremities:  Erythema on right foot has improved.  Mild tenderness with palpation.  No notable edema.  All other extremities normal.  No cyanosis or edema   Skin: Warm, dry.  Skin color, texture, turgor normal, no rashes or lesions         Coding guidelines for this visit:  Type of Medical   Decision Making Face-to-Face Visit       within 7 Days of discharge Face-to-Face Visit        within 14 days of discharge   Moderate Complexity [id number]   High Complexity [id number]       Electronically signed by Marcia Schmitt CNP 10/25/19 8:41 PM

## 2021-06-02 NOTE — PROGRESS NOTES
Hospital discharge follow up call to pt, no answer.  Left VM message reminding patient of their appt 10/25/19 at 7:20am.  RN will attempt call back at another time.    Shazia Tatum RN Care Manager, Population Health

## 2021-06-02 NOTE — PROGRESS NOTES
Second attempt to reach patient for hospital discharge follow up, no answer.  Left VM message reminding pt of their appt 10/25/19 at 7:20am.  RN has made two unsuccessful attempts to reach patient.  Encounter closed.    Shazia Tatum RN Care Manager, Population Health

## 2021-06-03 VITALS
HEART RATE: 80 BPM | SYSTOLIC BLOOD PRESSURE: 150 MMHG | WEIGHT: 239.8 LBS | BODY MASS INDEX: 37.64 KG/M2 | TEMPERATURE: 98.8 F | DIASTOLIC BLOOD PRESSURE: 86 MMHG | HEIGHT: 67 IN

## 2021-06-04 VITALS
HEIGHT: 67 IN | OXYGEN SATURATION: 98 % | RESPIRATION RATE: 20 BRPM | HEART RATE: 79 BPM | DIASTOLIC BLOOD PRESSURE: 86 MMHG | BODY MASS INDEX: 37.04 KG/M2 | TEMPERATURE: 98.9 F | WEIGHT: 236 LBS | SYSTOLIC BLOOD PRESSURE: 130 MMHG

## 2021-06-05 VITALS
HEART RATE: 60 BPM | DIASTOLIC BLOOD PRESSURE: 80 MMHG | BODY MASS INDEX: 32.7 KG/M2 | SYSTOLIC BLOOD PRESSURE: 138 MMHG | WEIGHT: 208.8 LBS

## 2021-06-05 VITALS
BODY MASS INDEX: 32.73 KG/M2 | OXYGEN SATURATION: 99 % | WEIGHT: 209 LBS | TEMPERATURE: 98.1 F | HEART RATE: 101 BPM | DIASTOLIC BLOOD PRESSURE: 88 MMHG | SYSTOLIC BLOOD PRESSURE: 138 MMHG

## 2021-06-05 NOTE — PROGRESS NOTES
Assessment/Plan:     1. Routine general medical examination at a health care facility  Encouraged healthy lifestyle habits including regular exercise, healthy eating habits, and adequate calcium and vitamin D intake.  Will await screening lipids.  Will check screening HIV level.  Up-to-date with Pap smear screening.  Discussed mammogram screening and encouraged mammogram due to family history of breast cancer in maternal grandmother.  She declines need for contraception.  - HIV Antigen/Antibody Screening Cascade  - Lipid [place] FASTING    2. Type 2 diabetes mellitus without complication, without long-term current use of insulin (H)  Inadequate control, partly due to dietary indiscretion, also has been off medications.  Encourage continued efforts at healthy eating habits, regular exercise.  Will restart metformin both for diabetes and assist in weight loss.  With her memory concerns persisting off metformin, I think her previous concerns regarding metformin contributing can be satisfied, will monitor for worsening.  Will check urine microalbumin today.  Referral placed for diabetes eye exam.  Advised initiation of aspirin 81 mg daily.  Will initiate atorvastatin 20 mg daily to reduce her risk of heart disease.  Follow-up in 3 months for A1c, advised checking sugars and following up with blood sugars in 1 month to assess diabetes control in the interim.  - Comprehensive Metabolic Panel  - Lipid [place] FASTING  - Ambulatory referral to Ophthalmology  - Glycosylated Hemoglobin A1c; Standing  - aspirin 81 MG EC tablet; Take 1 tablet (81 mg total) by mouth daily.  Dispense: 150 tablet; Refill: 2  - atorvastatin (LIPITOR) 20 MG tablet; Take 1 tablet (20 mg total) by mouth daily.  Dispense: 90 tablet; Refill: 4  - metFORMIN (GLUCOPHAGE XR) 500 MG 24 hr tablet; Take 1 tab by mouth daily with meal.  Increase by 1 tab each week as tolerated up to goal of 4 tabs daily.  You may take all 4 tabs at once.  Dispense: 360  tablet; Refill: 4  - Glycosylated Hemoglobin A1c  - Microalbumin, Random Urine; Future    3. Tinea pedis of right foot  Discussed my concerns about tinea pedis, especially in light of previous cellulitis.  We reviewed signs and symptoms of tinea pedis.  Prescription provided for clotrimazole to use twice daily for 2 weeks to treat current bout of tinea pedis.  Advised that she monitor for recurrence in the future and initiate treatment if she is CNs.  - clotrimazole (LOTRIMIN) 1 % cream; Apply thin layer between all tows of both feet twice daily for 14 days.  Dispense: 30 g; Refill: 0    4. Hyperlipidemia, unspecified hyperlipidemia type  Encouraged efforts at healthy lifestyle habits.  Will check fasting lipids and a baseline comprehensive metabolic panel.  Prescription is provided for atorvastatin 20 mg daily.  Discussed common side effects.  - Comprehensive Metabolic Panel  - Lipid [place] FASTING  - atorvastatin (LIPITOR) 20 MG tablet; Take 1 tablet (20 mg total) by mouth daily.  Dispense: 90 tablet; Refill: 4    5. Obesity (BMI 35.0-39.9) with comorbidity (H)  Encouraged efforts at healthy lifestyle habits.  She declines referral.  Will initiate metformin as above with the hope that this can help with weight loss as well.  Could also consider GLP-1 inhibitor again, had stopped Victoza last spring but perhaps had some benefit.  Would consider if inadequate weight loss with metformin.    6. Major depressive disorder, single episode, moderate (H)  7. Generalized anxiety disorder  Inadequately controlled.  I discussed the potential effects of inadequately controlled depression and anxiety.  Discussed treatment options.  Encourage continued self-care, meditation, consider cognitive behavioral therapy.  Discussed potential benefits versus risk of medication.  She will consider.  Declines further intervention today.    8. Alcohol abuse  Discussed role of alcohol use in poor control of depression and anxiety.  He does  not seem to be an active issue.  Discussed importance of regulation and ongoing limitation.  She declines further intervention.  - Microalbumin, Random Urine; Future    9. Hypothyroidism due to Hashimoto's thyroiditis  Encouraged continued levothyroxine.  Feeling euthyroid.  Will check thyroid cascade today.    10. Hypomagnesemia  She is not currently taking a supplement.  Will check magnesium level today to determine whether supplement is required.  - Magnesium    11. Hyponatremia  Identified during hospitalization.  Will check follow-up sodium level in her comprehensive metabolic panel today.  Denies symptoms of hyponatremia.  - Comprehensive Metabolic Panel    12. Hypothyroidism  As above  - levothyroxine (SYNTHROID, LEVOTHROID) 137 MCG tablet; Take 1 tablet (137 mcg total) by mouth daily.  Dispense: 90 tablet; Refill: 3    13. Memory loss  This is mild.  Unclear how much may be related to anxiety and depression.  It does not seem to be related to metformin as it is persisting and that she is off metformin.  We discussed options.  We discussed potential role of depression and anxiety that are poorly controlled and impacting memory.  Consider further evaluation.  We will follow-up in 1 month.    14. Cervicogenic headache  Reassurance provided that her exam and history are most suggestive of cervicogenic etiology.  Does not seem to be related to sinus inflammation.  We reviewed symptomatic treatments including posture, massage, and over-the-counter analgesics.  Will follow-up in 1 month.    Patient Instructions   Begin checking your sugars twice daily.  Follow-up in clinic in 1 month to reassess your blood sugar control.  Continue to work on regular exercise and healthy eating as you already are.  Restart metformin extended release 500 mg.  Start with 1 tab daily.  Increase by 1 tab every week to goal of 4 tabs.  You may take them all at once.  Take them with food.       Return in about 1 month (around 3/3/2020)  for Diabetes.          Subjective:     Dhaval Austin is a 39 y.o. female who presents for an annual exam.  I last saw her a year ago.  She stopped metformin previously as she was concerned that it was causing some memory changes.  Notes that these memory changes have persisted despite this.  She stopped Victoza last spring.  She is uncertain exactly why, believes she is just hoping to be off medication.  She is not currently taking aspirin or statin and management of her diabetes.  Overall feels she is eating well and is making some improvements in the last few weeks.  She has begun doing some exercises again including walking and doing more squats.  She is looking to continue to build on this.  She is not currently taking treatment for dyslipidemia.  History of depression and anxiety, noted significant struggles this fall.  Previously had taken venlafaxine but she is been off of it entirely, feels like she was having side effects.  Felt that increasing work stress likely was contributing to her increase symptoms in the fall.  Work is now settling down.  She is been focusing on self-care including drawing and art work, regular exercise, and is now meditating.  Feels that she is not yet at goal but is not interested in medication treatments.  History of hypothyroidism for which she is taking levothyroxine feeling good balance.  Previous history of alcohol abuse, is now drinking 1-2 alcoholic beverages per week, feels she is doing very well in limiting her intake, developing other coping skills for her depression and anxiety.  She was hospitalized in October due to cellulitis, likely stemming from tinea pedis.  During that time she was noted to have low magnesium and low sodium, is due for follow-up of that.    She has additional concerns today.  She is noting posterior head pain, wondering if it is from her sinuses, seems to be worsening this winter.  Also notes that her neck is sore, wonders if she is sleeping  well.  Noting some postnasal drainage but no facial pain or pressure.  She has not tried any current or recent interventions.  No nausea.  No focal neurologic symptoms: Crying with this.  Noting ongoing concerns about memory loss that we discussed in the past, previously thought was from metformin though did not note improvement with discontinuation of metformin.  In the past had very good memory.  Now noting small lapses like word finding at times or recalling names.  There have been many changes at work, feels like she is having difficulty retaining information.  She is more conscious of her work and doing more double checking for that reason, is not necessarily interfering directly with her work.  Feels her focus is okay, feels it is more of a mild disinterest instead.    Note family history of breast cancer in her maternal grandmother, patient has not yet schedule a mammogram.    Healthy Habits:   Healthy Diet: working on this  Regular Exercise: just initiating  Sunscreen Use: Yes  Dental Visits Regularly: Yes  Seat Belt: Yes  Domestic abuse:   No    Health Maintenance reviewed:  Lipid Profile: due  Glucose Screen: due  Colonoscopy: NA  Mammogram: due (family history)    Gynecologic History  No LMP recorded.  Last Pap: 7/3/17. Results were: normal, HPV neg        Immunization History   Administered Date(s) Administered     Influenza, inj, historic,unspecified 09/12/2016     Influenza, seasonal,quad inj 6-35 mos 10/29/2009     Pneumo Polysac 23-V 08/13/2014     Tdap 03/22/2011     Immunization status: up to date and documented.    Current Outpatient Medications   Medication Sig Dispense Refill     ACCU-CHEK COMPACT TEST Strp Test daily before all meals/snacks and once before bedtime. 100 strip 11     blood-glucose meter (ACCU-CHEK JOÃO) Misc Use 1 Device As Directed daily. 1 each 1     generic lancets (ACCU-CHEK MULTICLIX LANCET) Test daily, varying times. 100 each 3     levothyroxine (SYNTHROID, LEVOTHROID) 137  MCG tablet Take 1 tablet (137 mcg total) by mouth daily. 90 tablet 3     aspirin 81 MG EC tablet Take 1 tablet (81 mg total) by mouth daily. 150 tablet 2     atorvastatin (LIPITOR) 20 MG tablet Take 1 tablet (20 mg total) by mouth daily. 90 tablet 4     clotrimazole (LOTRIMIN) 1 % cream Apply thin layer between all tows of both feet twice daily for 14 days. 30 g 0     metFORMIN (GLUCOPHAGE XR) 500 MG 24 hr tablet Take 1 tab by mouth daily with meal.  Increase by 1 tab each week as tolerated up to goal of 4 tabs daily.  You may take all 4 tabs at once. 360 tablet 4     No current facility-administered medications for this visit.      Past Medical History:   Diagnosis Date     Anxiety      Cellulitis , 2014     Diabetes mellitus (H)     Pre- diabetic     Hirsutism      HLD (hyperlipidemia)      Hypothyroid      Obesity      Polycystic ovarian disease      Past Surgical History:   Procedure Laterality Date      SECTION  ,      Patient has no known allergies.  Family History   Problem Relation Age of Onset     Liver cancer Father      Colon cancer Father      Social History     Socioeconomic History     Marital status:      Spouse name: Not on file     Number of children: Not on file     Years of education: Not on file     Highest education level: Not on file   Occupational History     Not on file   Social Needs     Financial resource strain: Not on file     Food insecurity:     Worry: Not on file     Inability: Not on file     Transportation needs:     Medical: Not on file     Non-medical: Not on file   Tobacco Use     Smoking status: Former Smoker     Last attempt to quit: 2014     Years since quittin.9     Smokeless tobacco: Never Used   Substance and Sexual Activity     Alcohol use: Yes     Alcohol/week: 1.0 standard drinks     Types: 1 Cans of beer per week     Drug use: No     Sexual activity: Not on file   Lifestyle     Physical activity:     Days per week: Not on file      "Minutes per session: Not on file     Stress: Not on file   Relationships     Social connections:     Talks on phone: Not on file     Gets together: Not on file     Attends Restoration service: Not on file     Active member of club or organization: Not on file     Attends meetings of clubs or organizations: Not on file     Relationship status: Not on file     Intimate partner violence:     Fear of current or ex partner: Not on file     Emotionally abused: Not on file     Physically abused: Not on file     Forced sexual activity: Not on file   Other Topics Concern     Not on file   Social History Narrative     Not on file       Review of Systems  General:  Denies problem  Eyes: Denies problem  Ears/Nose/Throat: Denies problem  Cardiovascular: Denies problem  Respiratory:  Denies problem  Gastrointestinal:  Denies problem   Genitourinary: Denies problem  Musculoskeletal:  Denies problem  Skin: Denies problem  Neurologic: Denies problem  Psychiatric: Denies problem  Endocrine: Denies problem  Heme/Lymphatic: Denies problem   Allergic/Immunologic: Denies problem            Objective:        Vitals:    02/03/20 0934   BP: 130/86   Pulse: 79   Resp: 20   Temp: 98.9  F (37.2  C)   TempSrc: Oral   SpO2: 98%   Weight: (!) 236 lb (107 kg)   Height: 5' 6.5\" (1.689 m)     Body mass index is 37.52 kg/m .    Physical Exam:  General Appearance: Alert, pleasant, appears stated age; affect is appropriate  Head: Normocephalic, without obvious abnormality  Eyes: PERRL, conjunctiva/corneas clear, EOM's intact  Ears: Normal TM's and external ear canals, both ears  Nose: Nares normal, septum midline,mucosa normal, no drainage  Throat: Lips, mucosa, and tongue normal; teeth and gums normal; oropharynx is clear  Neck: Supple,without lymphadenopathy or thyromegally  Lungs: Clear to auscultation bilaterally, respirations unlabored  Heart: Regular rate and rhythm, no murmur   Breast:  Normal appearance.  No palpable masses, nipple discharge, or " skin changes  Abdomen: Soft, non-tender, no masses, no organomegaly  Extremities: Extremities with strong and symmetric pulses, no cyanosis or edema  Skin: Skin color, texture normal, no rashes or lesions; tinea pedis between 4-5 toes of L foot  Neurologic: Normal ; good recall of information on exam today.  Able to track conversation without difficulty.  Pelvic exam: not indicated  Tenderness palpation at the base of her occiput at the insertion of her cervical spine musculature.  Good range of motion of neck.  No sinus tenderness on exam.               This note has been dictated using voice recognition software. Any grammatical or context distortions are unintentional and inherent to the the software.

## 2021-06-05 NOTE — PATIENT INSTRUCTIONS - HE
Begin checking your sugars twice daily.  Follow-up in clinic in 1 month to reassess your blood sugar control.  Continue to work on regular exercise and healthy eating as you already are.  Restart metformin extended release 500 mg.  Start with 1 tab daily.  Increase by 1 tab every week to goal of 4 tabs.  You may take them all at once.  Take them with food.

## 2021-06-08 NOTE — PROGRESS NOTES
Assessment/Plan:     1. Generalized anxiety disorder  Inadequate control.  Increase sertraline 100 mg daily.  She may do so directly or she may do 75 mg for a few days to ensure that she tolerates the transition.  Encouraged her in healthy lifestyle habits and avoidance of alcohol.  Encourage her to consider therapy if this becomes a possibility for her.  Encourage initiation of regular exercise.  Follow-up with me in 1 month, notify me sooner with further problems or concerns.  I reviewed with her and confirmed that she still has information regarding mental health urgent care if needed or with return of suicidal ideation symptoms.    2. Alcohol abuse  Overall is doing quite well with alcohol only once since her last visit.  Encouraged efforts at abstinence.  Declines assistance with this.    3. Left wrist pain  Most likely sprain.  I do not feel that her symptoms correlate with the scapholunate widening seen by radiology.  She may continues a splint as needed and went anticipate resolution of symptoms within the next 4 weeks.  Notify me with persistence or worsening.  - XR Wrist Left 3 or More VWS; Future      Subjective:      Dhaval Austin is a 36 y.o. female presented to clinic today for follow-up of anxiety disorder.  She continues to have anxiety flares each day, oftentimes feeling overwhelmed at least once per day and usually at work.  She has not really identified any triggers for this.  Continues to feel anxious when in a car but has done so since her teenage years.  She had an isolated episode of panic attack at work where she was severely affected and does not feel that the Lorazepam was terribly helpful.  Has only taken lorazepam twice since her last visit.  Feels that she has been sleeping better with the addition of sertraline and eating more consistently.  Noting however that there is been minimal improvement in her anxiety.  Continues to struggle with poor motivation.  Continues to  intermittently have passive suicidal ideation without plan.  When this occurs, she usually will pace or rock and distract herself and that usually takes care of things.  She is comfortable in discussing these feelings with her  and her close friend.  She had a single episode of alcohol intake during the Super Bowl, has had some alcohol cravings but feels that overall she is managing this well.    She slipped in the bathroom about 2 weeks ago landing on an outstretched left elbow first and then her left wrist slapped the ground hard as she fell.  She had no other injuries but has had some wrist pain since then.  It is mostly in the ulnar aspect of her wrist.  She has been wearing her splint and it seems to be supportive and helpful.  Wants to ensure she did not break anything or have any further injuries.  Noting no weakness.  She has not seen any bruising or swelling.    Current Outpatient Prescriptions   Medication Sig Dispense Refill     ACCU-CHEK COMPACT TEST Strp Test daily before all meals/snacks and once before bedtime. 100 strip 11     blood-glucose meter (ACCU-CHEK JOÃO) Misc Use 1 Device As Directed daily. 1 each 1     generic lancets (ACCU-CHEK MULTICLIX LANCET) Test daily, varying times. 100 each 3     levothyroxine (SYNTHROID, LEVOTHROID) 137 MCG tablet Take 1 tablet (137 mcg total) by mouth daily. 90 tablet 1     LORazepam (ATIVAN) 0.5 MG tablet Take 1 tablet (0.5 mg total) by mouth every 8 (eight) hours as needed for anxiety. 30 tablet 0     sertraline (ZOLOFT) 100 MG tablet Take 1 tablet (100 mg total) by mouth daily. 90 tablet 1     No current facility-administered medications for this visit.        Past Medical History, Family History, and Social History reviewed.  Past Medical History:   Diagnosis Date     Anxiety      Cellulitis 2013, 6/2014     Diabetes mellitus     Pre- diabetic     Hirsutism      HLD (hyperlipidemia)      Hypothyroid      Obesity      Polycystic ovarian disease      Past  "Surgical History:   Procedure Laterality Date      SECTION  ,      Review of patient's allergies indicates no known allergies.  Family History   Problem Relation Age of Onset     Liver cancer Father      Colon cancer Father      Social History     Social History     Marital status:      Spouse name: N/A     Number of children: N/A     Years of education: N/A     Occupational History     Not on file.     Social History Main Topics     Smoking status: Former Smoker     Quit date: 2014     Smokeless tobacco: Not on file     Alcohol use 0.6 oz/week     1 Cans of beer per week     Drug use: No     Sexual activity: Not on file     Other Topics Concern     Not on file     Social History Narrative         Review of systems is as stated in HPI, and the remainder of the 10 system review is otherwise negative.    Objective:     Vitals:    17 0737   BP: 124/80   Patient Site: Left Arm   Patient Position: Sitting   Cuff Size: Adult Large   Pulse: 80   Weight: (!) 224 lb 14.4 oz (102 kg)   Height: 5' 7.5\" (1.715 m)    Body mass index is 34.7 kg/(m^2).    Alert female.  Mildly anxious.  Affect otherwise within normal limits.  Thought processes and judgment appear intact.  Left wrist is with mild tenderness palpation throughout the ulnar mid wrist, greatest dorsally.  No edema, fluctuance, or crepitus.  She has full range of motion of her fingers.    I ordered personally reviewed 3 view x-rays of her left wrist.  I do not appreciate any abnormalities on my review.  On radiology report there is concern about potential widening of the scapholunate joint.      This note has been dictated using voice recognition software. Any grammatical or context distortions are unintentional and inherent to the the software.     "

## 2021-06-08 NOTE — PROGRESS NOTES
Assessment/Plan:     1. Generalized anxiety disorder  2. Panic disorder without agoraphobia  3. Major depressive disorder, single episode, severe without psychosis  4. Passive suicidal ideations  Will initiate sertraline 25 mg daily for 5 days, then 50 mg daily.  Use lorazepam 0.5 mg 3 times daily as needed for severe anxiety or panic.  Encourage her to schedule appointment with therapist as she is planning.  I gave her information regarding mental health urgent care and crisis line should she develop suicidal ideation.  She is able to verbally contract for safety.  Discussed importance of communicating with another person should suicidal ideation develop.  Should this feeling become more frequent or more severe, advised that she contact us or present to the emergency room as well.  She is comfortable with this plan.  She will follow-up with me in 2 weeks.  In the event that she is needing to see of provider and I am not available, I have directed her to Dr. Teodora Horne at [facility].    5. Alcohol abuse  Strongly advised continued efforts at cessation.  Discussed importance of avoiding combining alcohol with Lorazepam and she states understanding.    6. Type 2 diabetes mellitus  Increase in A1c, likely secondary to increase in alcohol use and holidays.  Advised continued efforts at healthy lifestyle habits and advised the reintroduction of exercise.  - Glycosylated Hemoglobin A1c    7. Tendonitis of both wrists, de Quervain's  Likely from overuse.  She will monitor activities, may use a splint as needed, ice as needed.  Notify with persistence or worsening.      Subjective:      Dhaval Austin is a 36 y.o. female presented to clinic today for evaluation of anxiety and depression symptoms.  Since approximately November, when her  had a sudden seizure, she has noted progressive onset of anxiety, depression, paranoia, and panic attacks.  Alternates between wanting to sleep too much and having  "difficulty sleeping.  Appetite fluctuates and at times is very high and other times very low.  She is having significant difficulty with focus at home and at work, disrupting work.  Describes daily panic attacks.  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  This is a significant change for her.  Intermittently states that she is having suicidal ideation.  She has no specific plan in place.  When this occurs, she contacts her  who is very supportive and was able to assist her in management of her symptoms with good effect.  She has a history of anxiety 7 years ago, was treated for a short time with citalopram but had side effects, does not recall the exact side effects, and discontinued.  She had issues with heavy alcohol use, quit earlier this year, now since December has been drinking alcohol more frequently as a \"quick fix\" when she is experiencing severe emotions.  She has plans to discontinue once again and feels confident doing so.  She is not interested in assistance with this.  She is planning to seek therapy as well.    Noticing bilateral wrist pain, occurring on both the lateral and medial aspects of her wrist.  She has not noticed change in range of motion.  She has not noticed any specific activities that trigger it and no positions that alleviated.  She has not tried anything for it thus far.  No weakness, tingling, or numbness.    Current Outpatient Prescriptions   Medication Sig Dispense Refill     ACCU-CHEK COMPACT TEST Strp Test daily before all meals/snacks and once before bedtime. 100 strip 11     blood-glucose meter (ACCU-CHEK JOÃO) Misc Use 1 Device As Directed daily. 1 each 1     generic lancets (ACCU-CHEK MULTICLIX LANCET) Test daily, varying times. 100 each 3     levothyroxine (SYNTHROID, LEVOTHROID) 137 MCG tablet Take 1 tablet (137 mcg total) by mouth " "daily. 90 tablet 1     LORazepam (ATIVAN) 0.5 MG tablet Take 1 tablet (0.5 mg total) by mouth every 8 (eight) hours as needed for anxiety. 30 tablet 0     sertraline (ZOLOFT) 25 MG tablet Take 1 tablet (25 mg total) by mouth daily. 30 tablet 2     No current facility-administered medications for this visit.        Past Medical History, Family History, and Social History reviewed.  Past Medical History   Diagnosis Date     Anxiety      Cellulitis , 2014     Diabetes mellitus      Pre- diabetic     Hirsutism      HLD (hyperlipidemia)      Hypothyroid      Obesity      Polycystic ovarian disease      Past Surgical History   Procedure Laterality Date      section  ,      Review of patient's allergies indicates no known allergies.  Family History   Problem Relation Age of Onset     Liver cancer Father      Colon cancer Father      Social History     Social History     Marital status:      Spouse name: N/A     Number of children: N/A     Years of education: N/A     Occupational History     Not on file.     Social History Main Topics     Smoking status: Former Smoker     Quit date: 2014     Smokeless tobacco: Not on file     Alcohol use 0.6 oz/week     1 Cans of beer per week     Drug use: No     Sexual activity: Not on file     Other Topics Concern     Not on file     Social History Narrative         Review of systems is as stated in HPI, and the remainder of the 10 system review is otherwise negative.    Objective:     Vitals:    17 0805   BP: 130/84   Patient Site: Left Arm   Patient Position: Sitting   Cuff Size: Adult Regular   Pulse: 78   Resp: 20   Temp: 98.6  F (37  C)   Weight: (!) 228 lb (103.4 kg)   Height: 5' 6.5\" (1.689 m)    Body mass index is 36.25 kg/(m^2).    Alert female.  Affect is flattened.  Thought processes and judgment intact.  Normal psychomotor activity.  Good insight.  She is full range of motion of wrists bilaterally.  Mild tenderness palpation both medial " and lateral aspects of the wrist without any palpable abnormalities.  Finkelstein's maneuver reproduces symptoms.    This note has been dictated using voice recognition software. Any grammatical or context distortions are unintentional and inherent to the the software.

## 2021-06-09 NOTE — PROGRESS NOTES
Assessment/Plan:     1. Generalized anxiety disorder  Inadequate control, and significant side effects of sertraline.  I have her continue sertraline 50 mg daily for the next 5 days with starting venlafaxine 37.5 mg extended release, but stopped sertraline and increase venlafaxine to have any 5 mg extended release daily.  Follow-up with me in 4-6 weeks.  Advised abstinence from alcohol, she declines assistance with this.  Encouraged healthy lifestyle habits.    2. Alcohol abuse  Strongly advised cessation.  I discussed options including individual energy behavioral therapy, alcoholic's Anonymous, or treatment programs.  She will consider but declines assistance with this today.          Subjective:      Dhaval Austin is a 37 y.o. female presented to clinic today for follow-up of anxiety disorder.  Following last visit in February, she waited a few weeks before increasing dose, and upon doing so developed significant diarrhea.  Over the past 2 and half weeks she has been cutting dose back down to 50 mg daily but despite this is having some loose stools about once daily.  This is resulted in limitations in her physical activity as she is fearful of the episodes.  Admits that she continues to drink alcohol at times, this weekend in fact had significant anxiety flaring drink alcohol in excess.  She states that this is 1 of her coping mechanisms.  Has been able to quit cold turkey in the past.  3 days ago was called from school by her son because he was having a panic attack, that resulted in significant anxiety, and then he was not answering his phone as it was turned off.  Patient was very distressed by this.  This was the first time she took lorazepam in quite some time.  This also triggered additional anxiety and she feels was the trigger for her to start drinking alcohol over the weekend.  She denies suicidal ideation.  We review that she has not tolerated citalopram in the past as it caused eye  twitching.    Current Outpatient Prescriptions   Medication Sig Dispense Refill     ACCU-CHEK COMPACT TEST Strp Test daily before all meals/snacks and once before bedtime. 100 strip 11     blood-glucose meter (ACCU-CHEK JOÃO) Misc Use 1 Device As Directed daily. 1 each 1     generic lancets (ACCU-CHEK MULTICLIX LANCET) Test daily, varying times. 100 each 3     levothyroxine (SYNTHROID, LEVOTHROID) 137 MCG tablet Take 1 tablet (137 mcg total) by mouth daily. 90 tablet 1     LORazepam (ATIVAN) 0.5 MG tablet Take 1 tablet (0.5 mg total) by mouth every 8 (eight) hours as needed for anxiety. 30 tablet 0     sertraline (ZOLOFT) 100 MG tablet Take 1 tablet (100 mg total) by mouth daily. (Patient taking differently: Take 50 mg by mouth daily. Taking 1/2 tablet daily (50mg)) 90 tablet 1     No current facility-administered medications for this visit.        Past Medical History, Family History, and Social History reviewed.  Past Medical History:   Diagnosis Date     Anxiety      Cellulitis , 2014     Diabetes mellitus     Pre- diabetic     Hirsutism      HLD (hyperlipidemia)      Hypothyroid      Obesity      Polycystic ovarian disease      Past Surgical History:   Procedure Laterality Date      SECTION  ,      Review of patient's allergies indicates no known allergies.  Family History   Problem Relation Age of Onset     Liver cancer Father      Colon cancer Father      Social History     Social History     Marital status:      Spouse name: N/A     Number of children: N/A     Years of education: N/A     Occupational History     Not on file.     Social History Main Topics     Smoking status: Former Smoker     Quit date: 2014     Smokeless tobacco: Not on file     Alcohol use 0.6 oz/week     1 Cans of beer per week     Drug use: No     Sexual activity: Not on file     Other Topics Concern     Not on file     Social History Narrative         Review of systems is as stated in HPI, and the  "remainder of the 10 system review is otherwise negative.    Objective:     Vitals:    04/10/17 0716   BP: 138/86   Patient Site: Right Arm   Patient Position: Sitting   Cuff Size: Adult Large   Pulse: 72   Resp: 20   Temp: 97.8  F (36.6  C)   TempSrc: Oral   Weight: (!) 233 lb (105.7 kg)   Height: 5' 7.5\" (1.715 m)    Body mass index is 35.95 kg/(m^2).    Alert female.  Thought processes and judgment intact.  Appropriate affect.  Normal psychomotor activity.      This note has been dictated using voice recognition software. Any grammatical or context distortions are unintentional and inherent to the the software.     "

## 2021-06-09 NOTE — PROGRESS NOTES
"Dhaval Austin is a 40 y.o. female who is being evaluated via a billable telephone visit.      The patient has been notified of following:     \"This telephone visit will be conducted via a call between you and your physician/provider. We have found that certain health care needs can be provided without the need for a physical exam.  This service lets us provide the care you need with a short phone conversation.  If a prescription is necessary we can send it directly to your pharmacy.  If lab work is needed we can place an order for that and you can then stop by our lab to have the test done at a later time.    Telephone visits are billed at different rates depending on your insurance coverage. During this emergency period, for some insurers they may be billed the same as an in-person visit.  Please reach out to your insurance provider with any questions.    If during the course of the call the physician/provider feels a telephone visit is not appropriate, you will not be charged for this service.\"    Patient has given verbal consent to a Telephone visit? Yes    What phone number would you like to be contacted at? [phone]    Patient would like to receive their AVS by AVS Preference: Ap.    Additional provider notes:   Hospital Follow-up Visit:    Hospital/Nursing Home/IP Rehab Facility: [facility]  Date of Admission: 6/16  Date of Discharge: 6/18  Reason(s) for Admission: L foot cellulitis    Was your hospitalization related to COVID-19? No  Problems taking medications regularly:  None  Medication changes since discharge: None  Problems adhering to non-medication therapy:  None    Summary of hospitalization:  [facility] hospital discharge summary reviewed  Diagnostic Tests/Treatments reviewed.  Follow up needed: CBC, CMP  Other Healthcare Providers Involved in Patient s Care:         None  Update since discharge: improved.    Feeling better but low appetite and tired; feeling " good today. Still taking antibiotics, a few doses left - no side effects or issues except mild nausea/upset stomach. Reports foot is tender but looks good - swelling gone and very hint of pink. This is pt's 3rd infection of this kind; got really sick this time which was really scary. Fasting glucose was 200 this morning; taking metformin daily; scheduled to see DM ed this Friday. Pt reports these infections might be related to DM. Blood pressure has been good now - 135/81 today.    Post Discharge Medication Reconciliation: discharge medications reconciled, continue medications without change.  Plan of care communicated with patient              Assessment/Plan:  1. Hospital discharge follow-up  2. Cellulitis of left lower extremity  Pt reports improvement since hospital discharge. Still with faint redness and some tenderness of L foot but improving. Pt will finishing antibiotics tomorrow she believes - discussed that there is no need for longer course of antibiotics based on her current report. Discussed signs/sx that would warrant clinic or ER evaluation. Sent letter via [employer] to get more breaks at work to walk and help with foot.  - HM2(CBC w/o Differential); Future  - Comprehensive Metabolic Panel; Future    3. Type 2 diabetes mellitus without complication, without long-term current use of insulin (H)  Uncontrolled DM with recent A1c 7.8% - pt currently taking metformin alone (recently restarted). She has a meeting with DM ed in 2 days; may be worthwhile to consider adding another medication (hospital d/c mentions considering GLP1. Will send to PCP to review and work with pt on this issue.  - Microalbumin, Random Urine; Future      Phone call duration:  15 minutes    Elba Rico MD

## 2021-06-09 NOTE — TELEPHONE ENCOUNTER
Order placed for diabetes education.  Of note, I have not instructed patient to discontinue her medication, nor have I declined refills of her medications.VJ

## 2021-06-09 NOTE — TELEPHONE ENCOUNTER
----- Message from Teodora Perera, Diabetes Ed sent at 6/18/2020  2:46 PM CDT -----  Regarding: diabetes ed f/u needed  Dhaval was hospitalized at [facility] with sepsis related to cellulitis of LLE.  States she has had recurrent cellulitis.  Noted her diabetes medications were stopped, but she has poor memory of events surrounding why it was stopped, states her provider stopped when she was not able to f/u d/t COVID.  She is to be d/c on Metformin and f/u with diabetes educator for ongoing monitoring, education, titration of regimen as needed.  Please get referral (if not one active) for diabetes education in clinic and pete patient at [phone] to get this scheduled.  She says to leave a message as she hardly ever answers, she will call back.  She prefers [place], but [place] would be second choice of clinic.

## 2021-06-09 NOTE — PROGRESS NOTES
Diabetes Educator has received verbal consent for a telephone visit for the patient./ 20 minutes    Assessment:   --patient recently hospitalized for LLE Cellulitis, just home from hospital one week ago, patient states she has been very stressed in the past two weeks  --she has resumed Metformin per MD recommendation, is currently at 1500 mg dose, will increase to 2000 mg dose per MD, patient states that she was on Victoza in the past, but did not like the daily injections, she would be interested in weekly injection if necessary to control glucose  --appetite has been poor since returning from the hospital, she finished her antibiotic course yesterday, so hopes that appetite will improve.  --hydration with 5-6 big glasses of water/day, no sugary beverages  --prior to hospitalization she was consuming three meals/day:  Am: [employer] protein shake( 15 grams PRO, 7 grams CHO), coffee  Lunch: fried eggs/vegetables  Dinner: roasted vegetables, eggs or tofu or beans or shrimp  Patient is a pescatarian  -prior to hospitalization, patient was walking 5 minutes each hour during the work day and playing basketball with family up to 90 minutes/day, plus doing some walking in the evenings.   --patient states she does not sleep well, often gets only 4-5 hours/night, has insomnia       Blood glucose record:  Educator did not see the meter  Per recall patient states that FBS right around 200 mg/dl, she did one post meal check this week and it was 162 mg/dl     Medication prescribed:   Metformin 500 mg (2) twice daily    Education:  --discussed glucose readings for this past week, goals for BG and A1C, impact of illness/infection/stress/lack of sleep on glucose  --discussed nutrition, getting back her appetite, aiming for small meals, adding in protein sources, importance of hydration, sleep hygiene, stress mgmt.  --discussed hypo/hyperglycemia and treatment, easing back into her physical activity routine, adding in strengthening  when able,     Plan:   1. Test glucose once daily: alternate between fasting and two hours post meal.  2. Have regular scheduled meals to increase appetite, have a protein source with meals.  3. Exercise as tolerated, per MD discretion.  4. Increase metformin to 2000 mg/day per MD  5. Follow up with educator on 20, if glycemic goals not met at this time, will consult with MD regarding adding additional medication into plan.     Subjective and Objective:      Dhaval MARIA TERESA Austin is referred by Ramya Seaman for Diabetes Education.     Lab Results   Component Value Date    HGBA1C 7.8 (H) 2020       Education:     Monitoring   Meter (per above goals): virtual visit, meter not seen  Monitoring: Assessed and Discussed  BG goals: Assessed and Discussed    Nutrition Management  Nutrition Management: Assessed and Discussed  Weight: Not addressed  Portions/Balance: Assessed and Discussed  Carb ID/Count: Assessed and Discussed  Label Reading: Assessed and Discussed  Heart Healthy Fats: Assessed  Menu Planning: Assessed and Discussed  Dining Out: Not addressed  Physical Activity: Assessed and Discussed    Orals: Assessed and Discussed      Diabetes Disease Process: Assessed and Discussed    Acute Complications: Prevent, Detect, Treat:  Hypoglycemia: Assessed and Discussed  Hyperglycemia: Assessed and Discussed  Sick Days: Assessed and Discussed  Driving: Not addressed    Chronic Complications  Foot Care:Assessed and Discussed  Skin Care: Not addressed  Eye: Not addressed  ABC: Assessed  Teeth:Not addressed  Goal Setting and Problem Solving: Assessed and Discussed  Barriers: Assessed and Discussed  Psychosocial Adjustments: Assessed and Discussed      Time spent with the patient: 20 minutes for diabetes education and counseling.   Previous Education: no  Visit Type:DSMT  Hours Remainin, DSMT 9.5 and MNT 3      Genna Saenz  2020

## 2021-06-10 NOTE — PROGRESS NOTES
Diabetes Educator has received verbal consent for a telephone visit for the patient./ 20  Minutes    Assessment:   --patient states she is feeling better, appetite improving, although not back to normal, she finds she is not as hungry in the am and during the day,  Am meal: almonds or berries  Lunch: vegies, eggs or nuts or cheese, berries  Dinner: vegetables or salad, eggs or tofu or beans  Snacks: Ferris protein drink, nuts  --she is consuming 130 oz water/day  --disc golfing for 3 miles every other day, yoga, other cardio, jump rope- 150 jumps/day  --wearing compression socks, LLE cellulitis better  --dental six months ago, no eye appointment for the past five years  --patient states she has lost 20# in the past few months    Blood glucose record:  Educator did not see the meter  FBS:162,149,130,154,147,147( improved from 200's one month ago)  Post meal: 129,129,181,131    Medication prescribed:  Metformin 500 mg four tablets/day    Education:  --reviewed BG record, goals for BG and A1C, discussed recheck for A1C in the fall, if not at goal, she and MD may consider weekly injectable: Ozempic or Trulicity  --discussed nutrition, appetite, protein intake, hydration, exercise routine, sleep hygiene  --discussed preventative care: feet, eyes, teeth        Plan:   1. Schedule appointment with PCP in the fall for A1C recheck.  2. Check glucose twice daily: fasting and two hours post meal.  3. Schedule dental and eye exam  4.  Follow up with educator PRN  5. Have a protein source with meals/snacks.     Subjective and Objective:      Dhavalmorenita Austin is referred by Ramya Seaman for Diabetes Education.     Lab Results   Component Value Date    HGBA1C 7.8 (H) 06/16/2020                 Education:     Monitoring   Meter (per above goals): Competent  Monitoring: Assessed, Discussed and Literature provided  BG goals: Assessed, Discussed and Literature provided    Nutrition Management  Nutrition Management: Assessed,  Discussed and Literature provided  Weight: Assessed and Discussed  Portions/Balance: Assessed and Discussed  Carb ID/Count: Assessed and Discussed  Label Reading: Assessed  Heart Healthy Fats: Assessed  Menu Planning: Assessed and Discussed  Dining Out: Not addressed  Physical Activity: Assessed, Discussed and Literature provided    Orals: Assessed and Discussed    Diabetes Disease Process: Assessed, Discussed and Literature provided    Acute Complications: Prevent, Detect, Treat:  Hypoglycemia: Assessed  Hyperglycemia: Assessed and Discussed  Sick Days: Assessed and Discussed  Driving: Not addressed    Chronic Complications  Foot Care:Assessed and Discussed  Skin Care: Assessed and Discussed  Eye: Assessed and Discussed  ABC: Assessed  Teeth:Assessed and Discussed  Goal Setting and Problem Solving: Assessed and Discussed  Barriers: Assessed  Psychosocial Adjustments: Assessed      Time spent with the patient: 20 minutes for diabetes education and counseling.   Previous Education: yes  Visit Type:DSMT  Hours Remainin, DSMT 9 and MNT 3      Genna Saenz  2020

## 2021-06-11 NOTE — PROGRESS NOTES
Assessment/Plan:     1. Routine general medical examination at a health care facility  Encouraged healthy lifestyle habits including regular exercise, healthy eating habits, and adequate calcium and vitamin D intake.  Will await screening Pap smear.  Will obtain fasting lipids.   - Gynecologic Cytology (PAP Smear)    2. Type 2 diabetes mellitus  Diet controlled.  Encourage efforts at weight loss.  Will obtain urine microalbumin and conference of metabolic panel as noted.  - Glycosylated Hemoglobin A1c  - Comprehensive Metabolic Panel  - Microalbumin, Random Urine    3. Polycystic Ovarian Syndrome  Reviewed nature of condition.  Menses regular currently.    4. Generalized anxiety disorder  At this time she is preferring to not treat with medications.  We discussed importance of alcohol abstinence, healthy lifestyle habits to manage anxiety.  Consider therapy.    5. Alcohol abuse  Advised reduction in cessation.  She will consider.  Discussed effects of alcohol on anxiety and obesity.    6. Hypothyroidism  Continue levothyroxine, will check thyroid cascade today.  - Thyroid Cascade    7. Hyperlipidemia  Encouraged healthy lifestyle habits.  Will check fasting lipid cascade today.  - Lipid Cascade FASTING    8. Obesity  We discussed importance of regular dedicated exercise including both cardio and weightlifting.  I commended her on not being sedentary, however we discussed importance of dedicated exercise as well.  Encouraged continued efforts at healthy lifestyle habits.  We review effects of alcohol on metabolism and weight.  We review elevated insulin levels and its effect on weight as well.  I advised a low carbohydrate diet.  Consider weight loss management through [facility] or Dr. Teodora Horne.    The following are part of a depression follow up plan for the patient:  mental health care assessment and mental health care management  The following high BMI interventions were performed  this visit: encouragement to exercise and lifestyle education regarding diet        Subjective:     Dhaval Austin is a 37 y.o. female who presents for an annual exam.  Frustrated by her weight.  Feels that she is eating healthy with most meals consisting of vegetables and lean protein.  She is prepping her meals for the week.  She is doing yoga on a regular basis, walks at work, uses a balance board at work, and overall states that she is not sedentary.  Gym membership on hold for the summer.  Feels her sleep is adequate.  At last visit we had tried venlafaxine for anxiety but this resulted in significant diarrhea as well and so she elected to discontinue.  She feels that overall she is doing better from a mental health standpoint.  Continues to drink alcohol, 2-3 alcoholic beverages per week on average.  Known type 2 diabetes that is been diet controlled, morning sugars in the 130s remainder the day within normal limits.  History of polycystic ovarian syndrome, menses regular.  Hypothyroidism on levothyroxine.  History of hyperlipidemia, due for follow-up.    She is , 2 children.  Works as a .  She does not smoke.    Healthy Habits:   Healthy Diet: Yes  Regular Exercise: Yes  Sunscreen Use: irregular  Dental Visits Regularly: Yes  Seat Belt: Yes  Domestic abuse:   No    Health Maintenance reviewed:  Lipid Profile: due  Glucose Screen: NA  Colonoscopy: NA  Mammogram: NA    Gynecologic History  No LMP recorded.  Contraception: vasectomy  Last Pap: 3/2012. Results were: normal, HPV neg        Immunization History   Administered Date(s) Administered     Influenza, inj, historic 09/12/2016     Influenza, seasonal,quad inj 6-35 mos 10/29/2009     Pneumo Polysac 23-V 08/13/2014     Tdap 03/22/2011     Immunization status: up to date and documented.    Current Outpatient Prescriptions   Medication Sig Dispense Refill     ACCU-CHEK COMPACT TEST Strp Test daily before all meals/snacks and once  before bedtime. 100 strip 11     blood-glucose meter (ACCU-CHEK JOÃO) Misc Use 1 Device As Directed daily. 1 each 1     generic lancets (ACCU-CHEK MULTICLIX LANCET) Test daily, varying times. 100 each 3     levothyroxine (SYNTHROID, LEVOTHROID) 137 MCG tablet TAKE 1 TABLET (137 MCG TOTAL) BY MOUTH DAILY. 90 tablet 1     LORazepam (ATIVAN) 0.5 MG tablet Take 1 tablet (0.5 mg total) by mouth every 8 (eight) hours as needed for anxiety. 30 tablet 0     No current facility-administered medications for this visit.      Past Medical History:   Diagnosis Date     Anxiety      Cellulitis , 2014     Diabetes mellitus     Pre- diabetic     Hirsutism      HLD (hyperlipidemia)      Hypothyroid      Obesity      Polycystic ovarian disease      Past Surgical History:   Procedure Laterality Date      SECTION  ,      Review of patient's allergies indicates no known allergies.  Family History   Problem Relation Age of Onset     Liver cancer Father      Colon cancer Father      Social History     Social History     Marital status:      Spouse name: N/A     Number of children: N/A     Years of education: N/A     Occupational History     Not on file.     Social History Main Topics     Smoking status: Former Smoker     Quit date: 2014     Smokeless tobacco: Not on file     Alcohol use 0.6 oz/week     1 Cans of beer per week     Drug use: No     Sexual activity: Not on file     Other Topics Concern     Not on file     Social History Narrative       Review of Systems  General:  Denies problem  Eyes: Denies problem  Ears/Nose/Throat: Denies problem  Cardiovascular: Denies problem  Respiratory:  Denies problem  Gastrointestinal:  Denies problem   Genitourinary: Denies problem  Musculoskeletal:  Denies problem  Skin: Denies problem  Neurologic: Denies problem  Psychiatric: Denies problem  Endocrine: Denies problem  Heme/Lymphatic: Denies problem   Allergic/Immunologic: Denies problem            Objective:     "    Vitals:    07/03/17 0724   BP: 136/88   Pulse: 72   Resp: 20   Temp: 98.6  F (37  C)   Weight: (!) 242 lb 14.4 oz (110.2 kg)   Height: 5' 6.75\" (1.695 m)     Body mass index is 38.33 kg/(m^2).    Physical Exam:  General Appearance: Alert, pleasant, appears stated age  Head: Normocephalic, without obvious abnormality  Eyes: PERRL, conjunctiva/corneas clear, EOM's intact  Ears: Normal TM's and external ear canals, both ears  Nose: Nares normal, septum midline,mucosa normal, no drainage  Throat: Lips, mucosa, and tongue normal; teeth and gums normal; oropharynx is clear  Neck: Supple,without lymphadenopathy or thyromegally  Lungs: Clear to auscultation bilaterally, respirations unlabored  Heart: Regular rate and rhythm, no murmur   Breast:  Normal appearance.  No palpable masses, nipple discharge, or skin changes  Abdomen: Soft, non-tender, no masses, no organomegaly  Extremities: Extremities with strong and symmetric pulses, no cyanosis or edema  Skin: Skin color, texture normal, no rashes or lesions  Neurologic: Normal   Pelvic exam: Normal external female genitalia.  Vaginal and cervical mucosa within normal limits on speculum exam.  Surepap obtained.                This note has been dictated using voice recognition software. Any grammatical or context distortions are unintentional and inherent to the the software.   "

## 2021-06-11 NOTE — PROGRESS NOTES
Assessment/Plan:    1. Herpes zoster without complication  I suspect shingles based on patient's history exam findings today.  We discussed the nature of shingles and treatment options.  Will proceed with valacyclovir 1000 mg 3 times a day for 7 days.  She will follow-up with a scheduled telephone visit in 2 weeks.  She should notify us sooner if symptoms worsen or if she develops any new symptoms.  - valACYclovir (VALTREX) 1000 MG tablet; Take 1 tablet (1,000 mg total) by mouth 3 (three) times a day for 7 days.  Dispense: 21 tablet; Refill: 0    2. Type 2 diabetes mellitus without complication, without long-term current use of insulin (H)  Refill provided for daily. Continue metformin for management of type 2 diabetes.  Follow-up within 3 months for diabetes check.  - aspirin 81 MG EC tablet; Take 1 tablet (81 mg total) by mouth daily.  Dispense: 150 tablet; Refill: 2      Subjective:    Dhaval Austin is a 40 year old female seen today for evaluation of right shoulder, armpit and right-sided chest pain along with new onset rash.  Patient developed pain 5 days ago, initially in her right shoulder and right armpit.  She noticed some swelling in the lymph nodes of her right armpit as well.  Initially thought she injured her right shoulder while working out.  States that she has been trying to do more planks recently.  Just yesterday, she developed a linear rash across her right breast.  It is very tender to the touch.  She has tenderness when touching under her armpit and around her right back as well.  She denies feeling feverish, chills or fatigue.  No chest pain, shortness of breath or difficulty breathing.  The pain is making it more difficult for her to sleep at night.  She denies having history of shingles.  Patient did have chickenpox as a child and recalls having a very swollen lymph nodes under her arm as well. Review of systems is as stated in HPI, and the remainder of the 10 system review is otherwise  unremarkable.    Past Medical History, Family History, and Social History reviewed.    Past Surgical History:   Procedure Laterality Date     ABDOMINAL SURGERY      C-Sections      SECTION  ,         Family History   Problem Relation Age of Onset     Liver cancer Father      Colon cancer Father         Past Medical History:   Diagnosis Date     Anxiety      Cellulitis , 2014     Diabetes mellitus (H)     Pre- diabetic     Hirsutism      HLD (hyperlipidemia)      Hypothyroid      Obesity      Polycystic ovarian disease         Social History     Tobacco Use     Smoking status: Former Smoker     Last attempt to quit: 2014     Years since quittin.6     Smokeless tobacco: Never Used     Tobacco comment: FORMAL SMOKER   Substance Use Topics     Alcohol use: Yes     Alcohol/week: 1.0 standard drinks     Types: 1 Cans of beer per week     Drug use: No        Current Outpatient Medications   Medication Sig Dispense Refill     ACCU-CHEK COMPACT TEST Strp Test daily before all meals/snacks and once before bedtime. 100 strip 11     atorvastatin (LIPITOR) 20 MG tablet Take 1 tablet (20 mg total) by mouth daily. 90 tablet 4     blood-glucose meter (ACCU-CHEK JOÃO) Misc Use 1 Device As Directed daily. 1 each 1     generic lancets (ACCU-CHEK MULTICLIX LANCET) Test daily, varying times. 100 each 3     levothyroxine (SYNTHROID, LEVOTHROID) 137 MCG tablet Take 1 tablet (137 mcg total) by mouth daily. 90 tablet 3     metFORMIN (FORTAMET) 500 MG (OSM) 24 hr tablet Take 2 tablets (1,000 mg total) by mouth 2 (two) times a day with meals. Use home supply.  0     aspirin 81 MG EC tablet Take 1 tablet (81 mg total) by mouth daily. 150 tablet 2     valACYclovir (VALTREX) 1000 MG tablet Take 1 tablet (1,000 mg total) by mouth 3 (three) times a day for 7 days. 21 tablet 0     No current facility-administered medications for this visit.           Objective:    Vitals:    20 1045   BP: 138/88   Pulse: (!) 101    Temp: 98.1  F (36.7  C)   SpO2: 99%   Weight: 209 lb (94.8 kg)      Body mass index is 32.73 kg/m .      General Appearance:  Alert, cooperative, no distress, appears stated age   Lungs:   Clear to auscultation bilaterally, respirations unlabored.  No expiratory wheeze or inspiratory crackles noted.   Heart:  Regular rate and rhythm, S1, S2 normal, no murmur, rub or gallop   Extremities:  Extremities appear normal.  Her right shoulder is with full range of motion.   Skin:  Skin is warm and dry.  Patient has a patchy, erythematous linear rash noted along the right breast tissue.  Tender to the touch.  No lesions noted elsewhere at this time.   Neurologic:  Grossly intact.  Normal strength and sensation noted bilaterally.           This note has been dictated using voice recognition software. Any grammatical or context distortions are unintentional and inherent to the use of this software.

## 2021-06-12 NOTE — PROGRESS NOTES
"Dhaval Austin is a 40 y.o. female who is being evaluated via a billable telephone visit.      The patient has been notified of following:     \"This telephone visit will be conducted via a call between you and your physician/provider. We have found that certain health care needs can be provided without the need for a physical exam.  This service lets us provide the care you need with a short phone conversation.  If a prescription is necessary we can send it directly to your pharmacy.  If lab work is needed we can place an order for that and you can then stop by our lab to have the test done at a later time.    Telephone visits are billed at different rates depending on your insurance coverage. During this emergency period, for some insurers they may be billed the same as an in-person visit.  Please reach out to your insurance provider with any questions.    If during the course of the call the physician/provider feels a telephone visit is not appropriate, you will not be charged for this service.\"    Patient has given verbal consent to a Telephone visit? Yes    What phone number would you like to be contacted at? [phone]    Patient would like to receive their AVS by AVS Preference: Ap.    Assessment/Plan:    1. Herpes zoster without complication  Follow-up phone visit for shingles.  Patient has finished course of acyclovir last week.  Doing much better today.  Pain is improving.  We discussed taking Tylenol and ibuprofen for any residual pain.  She may continue with ice packs as well.  She will continue to monitor for improvement over the next several weeks and notify us if she has any recurring or new symptoms.      Subjective:    Dhaval Austin is a 40-year-old female here today for follow-up on shingles.  Patient was seen in clinic 2 weeks ago with concerns of pain and rash wrapping around the right side of her trunk near her bra line.  She was started on acyclovir.  Today, she states that she " still has some mild tenderness but the actual rash is improving.  She is overall feeling much better.  She is not having as intense of pain.  No swelling.  No worsening of lesions.  She has been able to go back to playing her disc golf which she enjoys.  She is using an ice pack at times for the discomfort.  She denies having any systemic symptoms such as fevers, chills, body aches etc.  No additional concerns at this time. Review of systems is as stated in HPI, and the remainder of the 10 system review is otherwise unremarkable.    Past Medical History, Family History, and Social History reviewed.    Past Surgical History:   Procedure Laterality Date     ABDOMINAL SURGERY      C-Sections      SECTION  ,         Family History   Problem Relation Age of Onset     Liver cancer Father      Colon cancer Father         Past Medical History:   Diagnosis Date     Anxiety      Cellulitis , 2014     Diabetes mellitus (H)     Pre- diabetic     Hirsutism      HLD (hyperlipidemia)      Hypothyroid      Obesity      Polycystic ovarian disease         Social History     Tobacco Use     Smoking status: Former Smoker     Quit date: 2014     Years since quittin.6     Smokeless tobacco: Never Used     Tobacco comment: FORMAL SMOKER   Substance Use Topics     Alcohol use: Yes     Alcohol/week: 1.0 standard drinks     Types: 1 Cans of beer per week     Drug use: No        Current Outpatient Medications   Medication Sig Dispense Refill     ACCU-CHEK COMPACT TEST Strp Test daily before all meals/snacks and once before bedtime. 100 strip 11     aspirin 81 MG EC tablet Take 1 tablet (81 mg total) by mouth daily. 150 tablet 2     atorvastatin (LIPITOR) 20 MG tablet Take 1 tablet (20 mg total) by mouth daily. 90 tablet 4     blood-glucose meter (ACCU-CHEK JOÃO) Misc Use 1 Device As Directed daily. 1 each 1     generic lancets (ACCU-CHEK MULTICLIX LANCET) Test daily, varying times. 100 each 3     levothyroxine  (SYNTHROID, LEVOTHROID) 137 MCG tablet Take 1 tablet (137 mcg total) by mouth daily. 90 tablet 3     metFORMIN (FORTAMET) 500 MG (OSM) 24 hr tablet Take 2 tablets (1,000 mg total) by mouth 2 (two) times a day with meals. Use home supply.  0     No current facility-administered medications for this visit.           Objective:    There were no vitals filed for this visit.   There is no height or weight on file to calculate BMI.      General:   Patient sounds pleasant and cooperative over the phone.  No sign of respiratory distress, coughing etc.       This note has been dictated using voice recognition software. Any grammatical or context distortions are unintentional and inherent to the use of this software.     Phone call duration: 5 minutes    Marcia Schmitt, CNP

## 2021-06-12 NOTE — PROGRESS NOTES
Assessment/Plan:    1. Type 2 diabetes mellitus without complication, without long-term current use of insulin (H)  She is due for A1c check today.  She continues Metformin 1000 mg 2 times daily, tolerating well.  Will make any adjustments to medication regimen if indicated by A1c results.  Will check metabolic panel today.  She will continue checking daily blood sugars.  I recommend follow-up no later than 6 months.  - Comprehensive Metabolic Panel  - Glycosylated Hemoglobin A1c    2. Hyperlipidemia, unspecified hyperlipidemia type  History of hyperlipidemia noted.  Will check fasting lipids today.  Continue atorvastatin 20 mg daily and baby aspirin.  - Lipid Cascade FASTING    3. Hypothyroidism due to Hashimoto's thyroiditis  Due for thyroid cascade today.  Pain 137 mcg daily.  - Thyroid Cascade    5. Obesity (BMI 35.0-39.9) with comorbidity (H)  Praised patient on nearly 40 pound weight loss over the last several months.  She will continue to work on healthy lifestyle modifications regards to diet and exercise.    6.  History of alcoholism  Patient has been sober since June 2020.  No need for further assistance at this time.    Subjective:    Dhaval Austin is a 40 year old female seen today for follow up on Diabetes.  Past medical history is significant for type 2 diabetes, hyperlipidemia, hypothyroidism, alcoholism.  Patient was hospitalized in June 2020 with cellulitis of her foot.  She states that this is a recurrent issue though back when she was drinking heavy amounts of alcohol.  She has been sober since June 2020.  She restarted her Metformin at the time.  She has been checking her blood sugars once a day.  Fasting blood sugars average around 120.  This is very good for her.  She is tolerating Metformin well.  She remains on statin for cholesterol management and tolerating well.  She is due to have her thyroid rechecked today.  Continues levothyroxine 137 MCG daily.  She denies any chest pain,  palpitations or difficulty breathing.  She was seen in clinic couple of weeks ago with shingles diagnoses.  She is recovering well from this and does not have any additional concerns today.  Review of systems is as stated in HPI, and the remainder of the 10 system review is otherwise unremarkable.    Past Medical History, Family History, and Social History reviewed.    Past Surgical History:   Procedure Laterality Date     ABDOMINAL SURGERY      C-Sections      SECTION  ,         Family History   Problem Relation Age of Onset     Liver cancer Father      Colon cancer Father         Past Medical History:   Diagnosis Date     Anxiety      Cellulitis , 2014     Diabetes mellitus (H)     Pre- diabetic     Hirsutism      HLD (hyperlipidemia)      Hypothyroid      Obesity      Polycystic ovarian disease         Social History     Tobacco Use     Smoking status: Former Smoker     Quit date: 2014     Years since quittin.6     Smokeless tobacco: Never Used     Tobacco comment: [employer]   Substance Use Topics     Alcohol use: Yes     Alcohol/week: 1.0 standard drinks     Types: 1 Cans of beer per week     Drug use: No        Current Outpatient Medications   Medication Sig Dispense Refill     ACCU-CHEK COMPACT TEST Strp Test daily before all meals/snacks and once before bedtime. 100 strip 11     aspirin 81 MG EC tablet Take 1 tablet (81 mg total) by mouth daily. 150 tablet 2     atorvastatin (LIPITOR) 20 MG tablet Take 1 tablet (20 mg total) by mouth daily. 90 tablet 4     blood-glucose meter (ACCU-CHEK JOÃO) Misc Use 1 Device As Directed daily. 1 each 1     generic lancets (ACCU-CHEK MULTICLIX LANCET) Test daily, varying times. 100 each 3     levothyroxine (SYNTHROID, LEVOTHROID) 137 MCG tablet Take 1 tablet (137 mcg total) by mouth daily. 90 tablet 3     metFORMIN (FORTAMET) 500 MG (OSM) 24 hr tablet Take 2 tablets (1,000 mg total) by mouth 2 (two) times a day with meals. Use home supply.   0     No current facility-administered medications for this visit.           Objective:    Vitals:    10/20/20 0712   BP: 138/80   Patient Site: Right Arm   Patient Position: Sitting   Cuff Size: Adult Large   Pulse: 60   Weight: 208 lb 12.8 oz (94.7 kg)      Body mass index is 32.7 kg/m .      General Appearance:  Alert, cooperative, no distress, appears stated age   HEENT:  Normal.  No acute findings.   Neck: Supple, symmetrical, no thyroidmegaly   Lungs:   Clear to auscultation bilaterally, respirations unlabored.  No expiratory wheeze or inspiratory crackles noted.   Heart:  Regular rate and rhythm, S1, S2 normal, no murmur, rub or gallop   Extremities: Extremities normal.  No cyanosis or edema   Skin: Warm, dry.  Skin color, texture, turgor normal, no rashes or lesions   Neurologic: Equal strength and sensation throughout.           This note has been dictated using voice recognition software. Any grammatical or context distortions are unintentional and inherent to the use of this software.

## 2021-06-14 NOTE — TELEPHONE ENCOUNTER
Refill Approved    Rx renewed per Medication Renewal Policy. Medication was last renewed on 2/3/20.    Flor Alvarenga, [facility] Triage/Med Refill 1/19/2021     Requested Prescriptions   Pending Prescriptions Disp Refills     levothyroxine (SYNTHROID, LEVOTHROID) 137 MCG tablet 90 tablet 3     Sig: Take 1 tablet (137 mcg total) by mouth daily.       Thyroid Hormones Protocol Passed - 1/18/2021  1:28 PM        Passed - Provider visit in past 12 months or next 3 months     Last office visit with prescriber/PCP: 11/12/2018 Ramya Seaman MD OR same dept: 10/20/2020 Marcia Schmitt CNP OR same specialty: 10/20/2020 Marcia Schmitt CNP  Last physical: 2/3/2020 Last MTM visit: Visit date not found   Next visit within 3 mo: Visit date not found  Next physical within 3 mo: Visit date not found  Prescriber OR PCP: Ramya Seaman MD  Last diagnosis associated with med order: 1. Hypothyroidism  - levothyroxine (SYNTHROID, LEVOTHROID) 137 MCG tablet; Take 1 tablet (137 mcg total) by mouth daily.  Dispense: 90 tablet; Refill: 3    If protocol passes may refill for 12 months if within 3 months of last provider visit (or a total of 15 months).             Passed - TSH on file in past 12 months for patient age 12 & older     TSH   Date Value Ref Range Status   10/20/2020 0.90 0.30 - 5.00 uIU/mL Final

## 2021-06-15 PROBLEM — F10.10 ALCOHOL ABUSE: Status: ACTIVE | Noted: 2017-04-10

## 2021-06-16 PROBLEM — F32.1 MAJOR DEPRESSIVE DISORDER, SINGLE EPISODE, MODERATE (H): Status: ACTIVE | Noted: 2018-11-12

## 2021-06-16 PROBLEM — E66.01 MORBID OBESITY (H): Status: ACTIVE | Noted: 2018-11-12

## 2021-06-16 NOTE — TELEPHONE ENCOUNTER
Refill Approved    Rx renewed per Medication Renewal Policy. Medication was last renewed on 2/3/20.    Zach Hines, [facility] Triage/Med Refill 3/24/2021     Requested Prescriptions   Pending Prescriptions Disp Refills     atorvastatin (LIPITOR) 20 MG tablet 90 tablet 1     Sig: Take 1 tablet (20 mg total) by mouth daily.       Statins Refill Protocol (Hmg CoA Reductase Inhibitors) Passed - 3/23/2021  7:14 AM        Passed - PCP or prescribing provider visit in past 12 months      Last office visit with prescriber/PCP: 11/12/2018 Ramya Seaman MD OR same dept: 10/20/2020 Marcia Schmitt CNP OR same specialty: 10/20/2020 Marcia Schmitt CNP  Last physical: 2/3/2020 Last MTM visit: Visit date not found   Next visit within 3 mo: Visit date not found  Next physical within 3 mo: Visit date not found  Prescriber OR PCP: Ramya Seaman MD  Last diagnosis associated with med order: 1. Type 2 diabetes mellitus without complication, without long-term current use of insulin (H)  - atorvastatin (LIPITOR) 20 MG tablet; Take 1 tablet (20 mg total) by mouth daily.  Dispense: 90 tablet; Refill: 1    2. Hyperlipidemia, unspecified hyperlipidemia type  - atorvastatin (LIPITOR) 20 MG tablet; Take 1 tablet (20 mg total) by mouth daily.  Dispense: 90 tablet; Refill: 1    If protocol passes may refill for 12 months if within 3 months of last provider visit (or a total of 15 months).

## 2021-06-16 NOTE — TELEPHONE ENCOUNTER
metFORMIN (FORTAMET) 500 MG (OSM) 24 hr tablet  1,000 mg, Oral, 2 times daily with meals 6/18/2020 MARIYA WILSON  0 ordered           EditCancel Reorder       Summary: Take 2 tablets (1,000 mg total) by mouth 2 (two) times a day with meals. Use home supply., Starting Thu 6/18/2020, No Print   Dose, Frequency: 1,000 mg, 2 times daily with meals  Ord/Sold: 6/18/2020 (O)  Report  Taking:   Long-term:   Pharmacy: [facility] [address] Dose History       Patient Sig: Take 2 tablets (1,000 mg total) by mouth 2 (two) times a day with meals. Use home supply.       Ordered on: 6/18/2020       Admin Instructions: Use home supply.        CASE:  No print.  Refill sent to pharmacy.

## 2021-06-17 NOTE — PROGRESS NOTES
Assessment/Plan:     1. Routine general medical examination at a health care facility  Encouraged healthy lifestyle habits including regular exercise, healthy eating habits, and adequate calcium and vitamin D intake.  We will screen for hepatitis C per current guidelines.  Order placed for screening mammogram.  Order placed for screening colonoscopy due to family history of colon cancer.  Will check fasting lipids today.  - Hepatitis C Antibody (Anti-HCV)  - Mammo Screening Bilateral; Future    2. Type 2 diabetes mellitus without complication, without long-term current use of insulin (H)  Congratulated her on her excellent control.  Will reduce Metformin to 1000 mg extended release once daily.  Encouraged continued efforts at healthy lifestyle habits.  Advised that she schedule an eye exam.  Will await fasting lipids, comprehensive metabolic panel and monitoring of Metformin, and urine microalbumin today.  - Ambulatory referral to Ophthalmology  - Microalbumin, Random Urine  - Comprehensive Metabolic Panel  - Lipid Cascade FASTING  - Glycosylated Hemoglobin A1c; Standing  - Glycosylated Hemoglobin A1c  - metFORMIN (FORTAMET) 500 MG (OSM) 24 hr tablet; Take 2 tablets (1,000 mg total) by mouth daily with breakfast.  Dispense: 360 tablet; Refill: 2    3. Obesity (BMI 35.0-39.9) with comorbidity (H)  Encourage efforts at healthy lifestyle habits.  Continue Metformin but will reduce dose.  Notify me if she is noticing a sudden worsening of weight management.  - metFORMIN (FORTAMET) 500 MG (OSM) 24 hr tablet; Take 2 tablets (1,000 mg total) by mouth daily with breakfast.  Dispense: 360 tablet; Refill: 2    4. Polycystic Ovarian Syndrome  Stable with regular menses.  Congratulated her on her healthy lifestyle habits.    5. Major depressive disorder, single episode, moderate (H)  6. Generalized anxiety disorder  Stable and doing well per her perspective without need for medication.  Suicidal ideation noted is not true  suicidal ideation but intermittent considerations of what life would be like without her, and I am not concerned about this.  Depression action plan completed today.  Encourage efforts at healthy lifestyle habits.  Consider cognitive behavioral therapy.    7. Hyperlipidemia, unspecified hyperlipidemia type  Continue atorvastatin at current dose.  Will check comprehensive metabolic panel in monitoring of this medication and will check fasting lipids today.  - Comprehensive Metabolic Panel  - Lipid Cascade FASTING    8. Hypothyroidism due to Hashimoto's thyroiditis  Continue levothyroxine, will check thyroid cascade today.  - Thyroid Whitman  - T4, Free    9. Alcohol abuse  Congratulated her on her abstinence.    10. Atypical chest pain  EKG today is unremarkable which would be rather atypical in light of significant and severe symptoms for 4 days recently.  Because she had the associated nausea and diarrhea, it is possible that this is more GI in nature, therefore will obtain comprehensive metabolic panel and ultrasound of the gallbladder  - Electrocardiogram Perform and Read  - US Abdomen Limited; Future    11. Family history of colon cancer in father  Referral placed for screening colonoscopy  - Ambulatory referral for Colonoscopy - [facility]    12. Adverse effect of vaccine, subsequent encounter  She is going to hold off on her second Covid vaccine as result of the arthralgias.  I would appreciate allergist opinion as to whether to proceed with second Covid vaccine.  - Ambulatory referral to Allergy    13. Chronic nasal congestion  Advised use of over-the-counter treatments.  She is interested in visiting with an allergist to discuss this, referral is placed.  - Ambulatory referral to Allergy     Patient Instructions     Therapist groups in the area:    [facility] ([place] and other sites) [phone]    [facility] ([place]) [phone]    [facility] ([place])  [phone]    [facility] ([place]) [phone]                  My Depression Action Plan  Name: Dhaval Austin   Date of Birth 1980  Date: 5/17/2021    My Doctor: Ramya Seaman MD   My Clinic: [facility]  [address]  [facility] [address]  [phone]          GREEN    ZONE   Good Control    What it looks like:     Things are going generally well. You have normal ups and downs. You may even feel depressed from time to time, but bad moods usually last less than a day.   What you need to do:  1. Continue to care for yourself (see self care plan)  2. Check your depression survival kit and update it as needed  3. Follow your physician s recommendations including any medication.  4. Do not stop taking medication unless you consult with your physician first.           YELLOW         ZONE Getting Worse    What it looks like:     Depression is starting to interfere with your life.     It may be hard to get out of bed; you may be starting to isolate yourself from others.    Symptoms of depression are starting to last most all day and this has happened for several days.     You may have suicidal thoughts but they are not constant.   What you need to do:     1. Call your care team. Your response to treatment will improve if you keep your care team informed of your progress. Yellow periods are signs an adjustment may need to be made.     2. Continue your self-care.  Just get dressed and ready for the day.  Don't give yourself time to talk yourself out of it.    3. Talk to someone in your support network.    4. Open up your depression Depression Self-Care Plan / Wellness kit.           RED    ZONE Medical Alert - Get Help    What it looks like:     Depression is seriously interfering with your life.     You may experience these or other symptoms: You can t get out of bed most days, can t work or engage in other necessary activities, you have  trouble taking care of basic hygiene, or basic responsibilities, thoughts of suicide or death that will not go away, self-injurious behavior.     What you need to do:  1. Call your care team and request a same-day appointment. If they are not available (weekends or after hours) call your local crisis line, emergency room or 911.          Depression Self-Care Plan / Wellness Kit    Many people find that medication and therapy are helpful treatments for managing depression. In addition, making small changes to your everyday life can help to boost your mood and improve your wellbeing. Below are some tips for you to consider. Be sure to talk with your medical provider and/or behavioral health consultant if your symptoms are worsening or not improving.     Sleep   Sleep hygiene  means all of the habits that support good, restful sleep. It includes maintaining a consistent bedtime and wake time, using your bedroom only for sleeping or sex, and keeping the bedroom dark and free of distractions like a computer, smartphone, or television.     Develop a Healthy Routine  Maintain good hygiene. Get out of bed in the morning, make your bed, brush your teeth, take a shower, and get dressed. Don t spend too much time viewing media that makes you feel stressed. Find time to relax each day.    Exercise  Get some form of exercise every day. This will help reduce pain and release endorphins, the  feel good  chemicals in your brain. It can be as simple as just going for a walk or doing some gardening, anything that will get you moving.      Diet  Strive to eat healthy foods, including fruits and vegetables. Drink plenty of water. Avoid excessive sugar, caffeine, alcohol, and other mood-altering substances.     Stay Connected with Others  Stay in touch with friends and family members.    Manage Your Mood  Try deep breathing, massage therapy, biofeedback, or meditation. Take part in fun activities when you can. Try to find something to  smile about each day.     Psychotherapy  Be open to working with a therapist if your provider recommends it.     Medication  Be sure to take your medication as prescribed. Most anti-depressants need to be taken every day. It usually takes several weeks for medications to work. Not all medicines work for all people. It is important to follow-up with your provider to make sure you have a treatment plan that is working for you. Do not stop your medication abruptly without first discussing it with your provider.    Crisis Resources   These hotlines are for both adults and children. They and are open 24 hours a day, 7 days a week unless noted otherwise.      National Suicide Prevention Lifeline   2-817-966-TALK (9320)      Crisis Text Line    www.crisistextline.org  Text HOME to 247697 from anywhere in the [place], anytime, about any type of crisis. A live, trained crisis counselor will receive the text and respond quickly.      [facility] Lifeline for LGBTQ Youth  A national crisis intervention and suicide lifeline for LGBTQ youth under 25. Provides a safe place to talk without judgement. Call 1-872.297.1845; text START to 282725 or visit www.[facility] healthvorproject.org to talk to a trained counselor.      For [facility] crisis numbers, visit the [facility] website at:  https://[place].gov/dhs/people-we-serve/adults/health-care/mental-health/resources/crisis-contacts.jsp         Return in about 6 months (around 11/17/2021) for Follow up diabetes and depression.          Subjective:     Dhaval Austin is a 41 y.o. female who presents for an annual exam.  History of diabetes, remains on Metformin extended release 2000 mg daily, tolerating well.  She remains on aspirin and statin as well.  She is due for an eye exam.  Has been eating very healthy overall.  Has been more active playing disc golf of late.  Overall pleased with how she is doing.  History of polycystic ovarian syndrome with regular menses now.  She is pleased that her  weight has been stable.  Remains on atorvastatin management of dyslipidemia in the setting of diabetes.  History of major depression and anxiety, noting some symptoms but no current medications.  States she sometimes considers what life would be like if she was not here, but has no suicidal intent or passive suicidal ideation, she does indicate abnormal response on PHQ-9 suicide screening question.  She is sober from alcohol for over 11 months now is very pleased with this.  Remains on levothyroxine daily for hypothyroidism, feeling in good balance.    Shingles in October late September, notes complete resolution of symptoms but is worried she may have some pain from shingles.  She describes 2 weeks ago left greater than right central chest pain associated with nausea to the extent that she could not sleep.  Describes it as being in the muscles or the breast of her chest.  Onset while she was playing a board game but associated with some tunnel vision and felt it was like a panic attack.  She then developed diarrhea, loss of appetite, and nausea.  The symptoms lasted several days.  Would have rare isolated palpitation with this.  It was nonexertional, has been able to exert herself since then without recurrence of symptoms.  Notes that the day prior to onset she had eaten poorly and had mostly water, coffee, and combos had a day long soccer tournament, this is quite atypical for her.    Describes chronic nasal congestion that seems to be worsened with dairy intake.  She has a history of reflux when she was younger.  Also noting overall significant increase in nasal congestion symptoms.  Interested in seeing an allergist.      Healthy Habits:   Healthy Diet: Yes  Regular Exercise: Yes  Sunscreen Use: Yes  Dental Visits Regularly: Yes  Seat Belt: Yes  Domestic abuse:   No    Health Maintenance reviewed:  Lipid Profile: Due today, fasting  Glucose Screen: Known diabetes  Colonoscopy: 7 years ago, family  history  Mammogram: No previous    Gynecologic History  No LMP recorded.  Contraception: vasectomy  Last Pap: 7/3/2017. Results were: Normal, HPV negative        Immunization History   Administered Date(s) Administered     COVID-19,PF,Pfizer 04/09/2021     Influenza, inj, historic,unspecified 09/12/2016     Influenza, seasonal,quad inj 6-35 mos 10/29/2009     Pneumo Polysac 23-V 08/13/2014     Tdap 03/22/2011     Immunization status: up to date and documented.  She only received 1 round of Covid vaccine, did not receive the second because she had significant joint aches in her hips, knees, and arms for several days after.    Current Outpatient Medications   Medication Sig Dispense Refill     ACCU-CHEK COMPACT TEST Strp Test daily before all meals/snacks and once before bedtime. 100 strip 11     aspirin 81 MG EC tablet Take 1 tablet (81 mg total) by mouth daily. 150 tablet 2     atorvastatin (LIPITOR) 20 MG tablet Take 1 tablet (20 mg total) by mouth daily. 90 tablet 2     blood-glucose meter (ACCU-CHEK JOÃO) Misc Use 1 Device As Directed daily. 1 each 1     cholecalciferol, vitamin D3, 1,000 unit (25 mcg) tablet Take 1,000 Units by mouth daily.       generic lancets (ACCU-CHEK MULTICLIX LANCET) Test daily, varying times. 100 each 3     levothyroxine (SYNTHROID, LEVOTHROID) 137 MCG tablet Take 1 tablet (137 mcg total) by mouth daily. 90 tablet 2     metFORMIN (FORTAMET) 500 MG (OSM) 24 hr tablet Take 2 tablets (1,000 mg total) by mouth daily with breakfast. 360 tablet 2     multivitamin therapeutic tablet Take 1 tablet by mouth daily.       zinc gluconate 50 mg tablet Take 50 mg by mouth every other day.       No current facility-administered medications for this visit.      Past Medical History:   Diagnosis Date     Anxiety      Cellulitis 2013, 6/2014     Diabetes mellitus (H) 2014    Pre- diabetic     Hirsutism      HLD (hyperlipidemia)      Hypothyroid      Obesity      Polycystic ovarian disease      Past  Surgical History:   Procedure Laterality Date     ABDOMINAL SURGERY      C-Sections      SECTION  ,      Patient has no known allergies.  Family History   Problem Relation Age of Onset     Liver cancer Father      Colon cancer Father      Social History     Socioeconomic History     Marital status:      Spouse name: Not on file     Number of children: Not on file     Years of education: Not on file     Highest education level: Not on file   Occupational History     Not on file   Social Needs     Financial resource strain: Not on file     Food insecurity     Worry: Not on file     Inability: Not on file     Transportation needs     Medical: Not on file     Non-medical: Not on file   Tobacco Use     Smoking status: Former Smoker     Quit date: 2014     Years since quittin.2     Smokeless tobacco: Never Used     Tobacco comment: FORMAL SMOKER   Substance and Sexual Activity     Alcohol use: Not Currently     Alcohol/week: 1.0 standard drinks     Types: 1 Cans of beer per week     Comment: sober 7 months     Drug use: No     Sexual activity: Yes     Partners: Male     Birth control/protection: None   Lifestyle     Physical activity     Days per week: Not on file     Minutes per session: Not on file     Stress: Not on file   Relationships     Social connections     Talks on phone: Not on file     Gets together: Not on file     Attends [employer] service: Not on file     Active member of club or organization: Not on file     Attends meetings of clubs or organizations: Not on file     Relationship status: Not on file     Intimate partner violence     Fear of current or ex partner: Not on file     Emotionally abused: Not on file     Physically abused: Not on file     Forced sexual activity: Not on file   Other Topics Concern     Not on file   Social History Narrative     Not on file       Review of Systems  General:  Denies problem  Eyes: Denies problem  Ears/Nose/Throat: Denies  "problem  Cardiovascular: Denies problem  Respiratory:  Denies problem  Gastrointestinal:  Denies problem   Genitourinary: Denies problem  Musculoskeletal:  Denies problem  Skin: Denies problem  Neurologic: Denies problem  Psychiatric: Denies problem  Endocrine: Denies problem  Heme/Lymphatic: Denies problem   Allergic/Immunologic: Denies problem            Objective:        Vitals:    05/17/21 0849   BP: 130/80   Pulse: 77   Resp: 20   Temp: 98.1  F (36.7  C)   TempSrc: Oral   SpO2: 98%   Weight: 200 lb 3.2 oz (90.8 kg)   Height: 5' 7\" (1.702 m)     Body mass index is 31.36 kg/m .    Physical Exam:  General Appearance: Alert, pleasant, appears stated age  Head: Normocephalic, without obvious abnormality  Eyes: PERRL, conjunctiva/corneas clear, EOM's intact  Ears: Normal TM's and external ear canals, both ears  Nose: Nares normal, septum midline,mucosa normal, no drainage  Throat: Lips, mucosa, and tongue normal; teeth and gums normal; oropharynx is clear  Neck: Supple,without lymphadenopathy or thyromegally  Lungs: Clear to auscultation bilaterally, respirations unlabored  Heart: Regular rate and rhythm, no murmur   Breast:  Normal appearance.  No palpable masses, nipple discharge, or skin changes  Abdomen: Soft, non-tender, no masses, no organomegaly  Extremities: Extremities with strong and symmetric pulses, no cyanosis or edema  Skin: Skin color, texture normal, no rashes or lesions  Neurologic: Normal   Pelvic exam: Not indicated    I ordered and personally reviewed a twelve-lead EKG which reveals normal sinus rhythm, no ischemic or arrhythmic changes.               This note has been dictated using voice recognition software. Any grammatical or context distortions are unintentional and inherent to the the software.   "

## 2021-06-17 NOTE — TELEPHONE ENCOUNTER
Had shot three weeks ago tomorrow. Bone aches in hands, shoulder, upset stomach. Should she reschedule the second covid-19 shot?`I advised she wait until she's feeling better. She said it could be from her new work out routine, as well as lingering from the first injection. She will reschedule the shot.  Irma Lamb RN  [facility]    Reason for Disposition    COVID-19 vaccine, Frequently Asked Questions (FAQs)    Additional Information    Negative: [1] Difficulty breathing or swallowing AND [2] starts within 2 hours after injection    Negative: Sounds like a life-threatening emergency to the triager    Negative: [1] COVID-19 exposure AND [2] no symptoms    Negative: [1] Typical COVID-19 symptoms AND [2] symptoms that are NOT expected from vaccine (e.g., cough, difficulty breathing, loss of taste or smell, runny nose, sore throat)    Negative: [1] Typical COVID-19 symptoms AND [2] started > 3 days after getting vaccine    Negative: Fever > 104 F (40 C)    Negative: Sounds like a severe, unusual reaction to the triager    Negative: [1] Redness or red streak around the injection site AND [2] started > 48 hours after getting vaccine AND [3] fever    Negative: [1] Fever > 101 F (38.3 C) AND [2] age > 60 AND [3] started > 48 hours after getting vaccine    Negative: [1] Fever > 100.0 F (37.8 C) AND [2] bedridden (e.g., nursing home patient, CVA, chronic illness, recovering from surgery) AND [3] started > 48 hours after getting vaccine    Negative: [1] Fever > 100.0 F (37.8 C) AND [2] diabetes mellitus or weak immune system (e.g., HIV positive, cancer chemo, splenectomy, organ transplant, chronic steroids) AND [3] started > 48 hours after getting vaccine    Negative: [1] Redness or red streak around the injection site AND [2] started > 48 hours after getting vaccine AND [3] no fever  (Exception: red area < 1 inch or 2.5 cm wide)    Negative: [1] Pain, tenderness, or swelling at the injection site AND [2]  over 3 days (72 hours) since vaccine AND [3] getting worse    Negative: Fever > 100.0 F (37.8 C) present > 3 days (72 hours)    Negative: [1] Fever > 100.0 F (37.8 C) AND [2] healthcare worker    Negative: [1] Pain, tenderness, or swelling at the injection site AND [2] lasts > 7 days    Negative: [1] Requesting COVID-19 vaccine AND [2] healthcare worker (e.g., EMS first responders, doctors, nurses)    Negative: [1] Requesting COVID-19 vaccine AND [2] resident of a long-term care facility (e.g., nursing home)    Negative: [1] Requesting COVID-19 vaccine AND [2] vaccine available in the community for this patient group    Negative: COVID-19 vaccine, injection site reaction (e.g., pain, redness, swelling), question about    Negative: COVID-19 vaccine, systemic reactions (e.g., fatigue, fever, muscle aches), questions about    Protocols used: CORONAVIRUS (COVID-19) VACCINE QUESTIONS AND [id number] 1.3.21

## 2021-06-17 NOTE — PATIENT INSTRUCTIONS - HE
Therapist groups in the area:    [facility] ([place] and other sites) [phone]    [facility] ([place]) [phone]    [facility] ([place]) [phone]    [facility] ([place]) [phone]                  My Depression Action Plan  Name: Dhaval Austin   Date of Birth 1980  Date: 5/17/2021    My Doctor: Ramya Seaman MD   My Clinic: [facility]  [address] [place] [address] [address] [facility] [address]  [facility] [address]  [phone]          GREEN    ZONE   Good Control    What it looks like:     Things are going generally well. You have normal ups and downs. You may even feel depressed from time to time, but bad moods usually last less than a day.   What you need to do:  1. Continue to care for yourself (see self care plan)  2. Check your depression survival kit and update it as needed  3. Follow your physician s recommendations including any medication.  4. Do not stop taking medication unless you consult with your physician first.           YELLOW         ZONE Getting Worse    What it looks like:     Depression is starting to interfere with your life.     It may be hard to get out of bed; you may be starting to isolate yourself from others.    Symptoms of depression are starting to last most all day and this has happened for several days.     You may have suicidal thoughts but they are not constant.   What you need to do:     1. Call your care team. Your response to treatment will improve if you keep your care team informed of your progress. Yellow periods are signs an adjustment may need to be made.     2. Continue your self-care.  Just get dressed and ready for the day.  Don't give yourself time to talk yourself out of it.    3. Talk to someone in your support network.    4. Open up your depression Depression Self-Care Plan / Wellness kit.           RED    ZONE Medical Alert - Get Help    What it looks like:     Depression is  seriously interfering with your life.     You may experience these or other symptoms: You can t get out of bed most days, can t work or engage in other necessary activities, you have trouble taking care of basic hygiene, or basic responsibilities, thoughts of suicide or death that will not go away, self-injurious behavior.     What you need to do:  1. Call your care team and request a same-day appointment. If they are not available (weekends or after hours) call your local crisis line, emergency room or 911.          Depression Self-Care Plan / Wellness Kit    Many people find that medication and therapy are helpful treatments for managing depression. In addition, making small changes to your everyday life can help to boost your mood and improve your wellbeing. Below are some tips for you to consider. Be sure to talk with your medical provider and/or behavioral health consultant if your symptoms are worsening or not improving.     Sleep   Sleep hygiene  means all of the habits that support good, restful sleep. It includes maintaining a consistent bedtime and wake time, using your bedroom only for sleeping or sex, and keeping the bedroom dark and free of distractions like a computer, smartphone, or television.     Develop a Healthy Routine  Maintain good hygiene. Get out of bed in the morning, make your bed, brush your teeth, take a shower, and get dressed. Don t spend too much time viewing media that makes you feel stressed. Find time to relax each day.    Exercise  Get some form of exercise every day. This will help reduce pain and release endorphins, the  feel good  chemicals in your brain. It can be as simple as just going for a walk or doing some gardening, anything that will get you moving.      Diet  Strive to eat healthy foods, including fruits and vegetables. Drink plenty of water. Avoid excessive sugar, caffeine, alcohol, and other mood-altering substances.     Stay Connected with Others  Stay in touch with  friends and family members.    Manage Your Mood  Try deep breathing, massage therapy, biofeedback, or meditation. Take part in fun activities when you can. Try to find something to smile about each day.     Psychotherapy  Be open to working with a therapist if your provider recommends it.     Medication  Be sure to take your medication as prescribed. Most anti-depressants need to be taken every day. It usually takes several weeks for medications to work. Not all medicines work for all people. It is important to follow-up with your provider to make sure you have a treatment plan that is working for you. Do not stop your medication abruptly without first discussing it with your provider.    Crisis Resources   These hotlines are for both adults and children. They and are open 24 hours a day, 7 days a week unless noted otherwise.      National Suicide Prevention Lifeline   8-417-519-KCSW (8961)      Crisis Text Line    www.crisistextline.org  Text HOME to 848394 from anywhere in the [place], anytime, about any type of crisis. A live, trained crisis counselor will receive the text and respond quickly.      [place] Lifeline for LGBTQ Youth  A national crisis intervention and suicide lifeline for LGBTQ youth under 25. Provides a safe place to talk without judgement. Call 1-128.894.2781; text START to 844530 or visit www.theGuidefittervorproject.org to talk to a trained counselor.      For [facility] crisis numbers, visit the [facility] website at:  https://[place].gov/dhs/people-we-serve/adults/health-care/mental-health/resources/crisis-contacts.jsp

## 2021-06-20 NOTE — PROGRESS NOTES
Assessment/Plan:     1. Type 2 diabetes mellitus (H)  Inadequate control.  Restart Victoza 0.6 mg once daily.  Follow-up with me in 6 months.  Will check urine microalbumin today.  Advised continued efforts at healthy lifestyle habits.  I am doubtful that Victoza was causing a change in her menses, she is understanding of this.  We will check urine micro albumin, conference of metabolic panel, and lipids today.  Discussed that per guidelines, we should know consider statin medication.  Will await lipid cascade to make that decision.  - Glycosylated Hemoglobin A1c; Standing  - Glycosylated Hemoglobin A1c  - Lipid Cascade FASTING  - Comprehensive Metabolic Panel    2. Hypothyroidism  Continue levothyroxine, will check thyroid cascade today.  - Thyroid Cascade    3. Generalized anxiety disorder  4. Major depressive disorder, single episode, moderate (H)  Inadequate control, active.  We discussed options.  Restart venlafaxine which she is taking in the past, will start 37.5 mg extended release once daily, increasing after 5 days to 75 mg daily.  Advised efforts at healthy lifestyle habits.  Consider cognitive behavioral therapy.  Discontinue alcohol use.  She declines assistance with this.  Follow-up in 1 month.    5. Alcohol abuse  Advised cessation.  She declines assistance.    6. Tinea pedis of right foot  Prescription provided for clotrimazole.    She will follow-up in clinic in 4 weeks for follow-up of anxiety/depression and diabetes.      The following are part of a depression follow up plan for the patient:  mental health care management    Subjective:      Dhaval Austin is a 38 y.o. female presented to clinic today for follow-up of her diabetes.  It has been over a year since I last saw her.  In January and February as she had noticed her blood sugars rising, we restarted venlafaxine at that time and she took it for about 3 months.  Notes that her menses seem to stop, she had a period in April then light  "spotting in May, , and July, irregular menses September 3 was heavy.  She has been off Victoza since about April.  Notes that her morning blood sugars are in the 150-180 range, better throughout the daytime.  No low blood sugars.  Exercising regularly.  Feels overall that she is eating healthy.  Admits that she has been drinking more alcohol again recently.  Oftentimes having 2-3 beers at a time, for example with the football game, and celebration of her anniversary recently, following a friend's  last week.  She had been sober for months.  Feels poorly the day after, feeling very bloated, but despite this finds a struggle in avoiding alcohol.  Also frustrated she is a very sedentary job, very unhappy with it, sometimes not able to get her lunch in.  She has got a fit bit which is been helpful, finding that she is only sleeping 4-5 hours at night however.  Some struggles with onset insomnia, some early awakening.  Increasing anxiety.  Finds that she has been more depressed as well with loss of enjoyment in things.  Enjoys traveling and reading, continues to do so but more struggles to complete this.  Father that her son has started high school.    Current Outpatient Prescriptions   Medication Sig Dispense Refill     ACCU-CHEK COMPACT TEST Strp Test daily before all meals/snacks and once before bedtime. 100 strip 11     blood-glucose meter (ACCU-CHEK JOÃO) Misc Use 1 Device As Directed daily. 1 each 1     generic lancets (ACCU-CHEK MULTICLIX LANCET) Test daily, varying times. 100 each 3     levothyroxine (SYNTHROID, LEVOTHROID) 137 MCG tablet TAKE 1 TABLET BY MOUTH DAILY 30 tablet 0     pen needle, diabetic (BD ULTRA-FINE JOÃO PEN NEEDLES) 32 gauge x \" Ndle Use to inject Victoza once daily 100 each 11     clotrimazole (LOTRIMIN) 1 % cream Apply thin layer between all toes twice daily for 2 weeks. 30 g 1     liraglutide (VICTOZA) 0.6 mg/0.1 mL (18 mg/3 mL) PnIj injection Inject 0.6 mg under the skin " "daily. With or without food. 9 mL 3     venlafaxine (EFFEXOR XR) 37.5 MG 24 hr capsule Take one tab by mouth daily for 5 days, then 2 tabs daily 60 capsule 2     No current facility-administered medications for this visit.        Past Medical History, Family History, and Social History reviewed.  Past Medical History:   Diagnosis Date     Anxiety      Cellulitis , 2014     Diabetes mellitus (H)     Pre- diabetic     Hirsutism      HLD (hyperlipidemia)      Hypothyroid      Obesity      Polycystic ovarian disease      Past Surgical History:   Procedure Laterality Date      SECTION  ,      Review of patient's allergies indicates no known allergies.  Family History   Problem Relation Age of Onset     Liver cancer Father      Colon cancer Father      Social History     Social History     Marital status:      Spouse name: N/A     Number of children: N/A     Years of education: N/A     Occupational History     Not on file.     Social History Main Topics     Smoking status: Former Smoker     Quit date: 2014     Smokeless tobacco: Never Used     Alcohol use 0.6 oz/week     1 Cans of beer per week     Drug use: No     Sexual activity: Not on file     Other Topics Concern     Not on file     Social History Narrative         Review of systems is as stated in HPI, and the remainder of the 10 system review is otherwise negative.    Objective:     Vitals:    18 0711   BP: 134/86   Patient Site: Right Arm   Patient Position: Sitting   Cuff Size: Adult Large   Pulse: 72   Resp: 20   Temp: 98.1  F (36.7  C)   TempSrc: Oral   Weight: (!) 245 lb (111.1 kg)   Height: 5' 6.75\" (1.695 m)    Body mass index is 38.66 kg/(m^2).    Alert female.  Tearful.  Mucous membranes moist.  Heart with regular rate and rhythm.  Lungs clear.  Extremities without edema.  Tinea pedis between the fourth and fifth digits of her right foot.  Otherwise normal foot exam.      This note has been dictated using voice " recognition software. Any grammatical or context distortions are unintentional and inherent to the the software.

## 2021-06-21 NOTE — LETTER
Letter by Ramya Seaman MD at      Author: Ramya Seaman MD Service: -- Author Type: --    Filed:  Encounter Date: 3/9/2021 Status: (Other)         March 9, 2021     Patient: Dhaval Austin   YOB: 1980   Date of Visit: 3/9/2021       To Whom It May Concern:    This letter is to confirm that Dhaval Austin has a diagnosis of type 2 diabetes.  This is controlled with medication.  She has not had significant hypoglycemic events.  She does not use insulin.  She may operate a motor vehicle without restriction.    If you have any questions or concerns, please don't hesitate to call.    Sincerely,        Electronically signed by Rayma Seaman MD

## 2021-06-21 NOTE — PROGRESS NOTES
Assessment/Plan:     1. Generalized anxiety disorder  2. Major depressive disorder, single episode, moderate (H)  Improved control.  Circumstances of the past week for her likely are causing some mild fluctuation in perception.  For now we will continue venlafaxine at current dose.  Continue efforts at healthy lifestyle habits.  Return to clinic in 2 months for reevaluation, sooner further problems or concerns.  Continue to avoid alcohol.    3. Acute recurrent sinusitis, unspecified location  Provided for Augmentin.  I also provided prescription for fluconazole in the event of yeast infection symptoms with this antibiotic.  Advised initiation of oral antihistamine and will also send in a prescription for fluticasone nasal spray to use until symptoms have resolved.    4. Type 2 diabetes mellitus without complication, without long-term current use of insulin (H)  Tolerating Victoza well.  She is not checking her blood sugars.  Possible low blood sugar though not classic symptoms.  Advised that she check her sugars here and there.  Continue compliance with Victoza.  Continue statin.    5. Obesity (BMI 37) with comorbidity  Will treat with Victoza as noted above.        Subjective:      Dhaval Austin is a 38 y.o. female presented to clinic today for follow-up of diabetes anxiety and depression.  At last visit we restarted her venlafaxine.  Overall is feeling more like herself and feels for the most part is that her balance is okay.  She has a vacation planned next week which is helpful.  She has not established with a therapist, is uncertain if she wants to.  She is eliminated alcohol for the past 8 weeks and is proud of this.  Unfortunately 1 of her coworkers left and another coworker had a stroke last week, and so she has been managing with that, putting in 13 hours of overtime in the past week.  Also lost her aunt this weekend and celebrated the anniversary of her father's passing as well.  This was a bit  "overwhelming.  Very poor appetite yesterday.  After eating only a small amount for supper and minimal throughout the remainder of the day, she felt a bit nauseated and off.  In egg seem to help.  Has not had anything to eat since then.  Has otherwise been tolerating the Victoza well.  Compliant with it.  Notes that her menses were very light with just spotting since then.    For the past 5 weeks or so has had a mild sore throat, postnasal drainage, sinus pressure greatest in the frontal region and also some in the maxillary region with bilateral tooth pain.  She had a bump or lymph node behind her right ear that is now resolved.  Denies fevers or chills.    Current Outpatient Medications   Medication Sig Dispense Refill     ACCU-CHEK COMPACT TEST Strp Test daily before all meals/snacks and once before bedtime. 100 strip 11     atorvastatin (LIPITOR) 20 MG tablet Take 1 tablet (20 mg total) by mouth daily. 90 tablet 3     blood-glucose meter (ACCU-CHEK JOÃO) Misc Use 1 Device As Directed daily. 1 each 1     clotrimazole (LOTRIMIN) 1 % cream Apply thin layer between all toes twice daily for 2 weeks. 30 g 1     generic lancets (ACCU-CHEK MULTICLIX LANCET) Test daily, varying times. 100 each 3     levothyroxine (SYNTHROID, LEVOTHROID) 137 MCG tablet Take 1 tablet (137 mcg total) by mouth daily. 90 tablet 3     liraglutide (VICTOZA) 0.6 mg/0.1 mL (18 mg/3 mL) PnIj injection Inject 0.6 mg under the skin daily. With or without food. 9 mL 3     pen needle, diabetic (BD ULTRA-FINE JOÃO PEN NEEDLES) 32 gauge x 5/32\" Ndle Use to inject Victoza once daily 100 each 11     venlafaxine (EFFEXOR XR) 75 MG 24 hr capsule Take 1 capsule (75 mg total) by mouth daily. 30 capsule 2     amoxicillin-clavulanate (AUGMENTIN) 875-125 mg per tablet Take 1 tablet by mouth 2 (two) times a day for 10 days. 20 tablet 0     fluconazole (DIFLUCAN) 150 MG tablet Take 1 tablet (150 mg total) by mouth once for 1 dose. 2 tablet 0     fluticasone " (FLONASE ALLERGY RELIEF) 50 mcg/actuation nasal spray 2 sprays each side of nose daily. 16 g 1     No current facility-administered medications for this visit.        Past Medical History, Family History, and Social History reviewed.  Past Medical History:   Diagnosis Date     Anxiety      Cellulitis , 2014     Diabetes mellitus (H)     Pre- diabetic     Hirsutism      HLD (hyperlipidemia)      Hypothyroid      Obesity      Polycystic ovarian disease      Past Surgical History:   Procedure Laterality Date      SECTION  ,      Patient has no known allergies.  Family History   Problem Relation Age of Onset     Liver cancer Father      Colon cancer Father      Social History     Socioeconomic History     Marital status:      Spouse name: Not on file     Number of children: Not on file     Years of education: Not on file     Highest education level: Not on file   Social Needs     Financial resource strain: Not on file     Food insecurity - worry: Not on file     Food insecurity - inability: Not on file     Transportation needs - medical: Not on file     Transportation needs - non-medical: Not on file   Occupational History     Not on file   Tobacco Use     Smoking status: Former Smoker     Last attempt to quit: 2014     Years since quittin.7     Smokeless tobacco: Never Used   Substance and Sexual Activity     Alcohol use: Yes     Alcohol/week: 0.6 oz     Types: 1 Cans of beer per week     Drug use: No     Sexual activity: Not on file   Other Topics Concern     Not on file   Social History Narrative     Not on file         Review of systems is as stated in HPI, and the remainder of the 10 system review is otherwise negative.    Objective:     Vitals:    18 0706   BP: 132/84   Patient Site: Right Arm   Patient Position: Sitting   Cuff Size: Adult Large   Pulse: 88   Resp: 20   Temp: 97.7  F (36.5  C)   TempSrc: Oral   SpO2: 98%   Weight: (!) 233 lb 6.4 oz (105.9 kg)   Height:  "5' 6.5\" (1.689 m)    Body mass index is 37.11 kg/m .    Alert female.  Pupils are equal, round, reactive to light.  Tympanic memories pearly and translucent with clear effusion on the right.  His mucosa is with moderate congestion.  Tenderness palpation frontal and maxillary sinuses.  Neck supple without lymphadenopathy or thyromegaly.  Heart regular rate and rhythm.  Lungs clear.  Affect is mildly flattened.      This note has been dictated using voice recognition software. Any grammatical or context distortions are unintentional and inherent to the the software.   "

## 2021-06-24 ENCOUNTER — RECORDS - HEALTHEAST (OUTPATIENT)
Dept: ADMINISTRATIVE | Facility: OTHER | Age: 41
End: 2021-06-24

## 2021-06-24 LAB — RETINOPATHY: NEGATIVE

## 2021-06-26 ENCOUNTER — HEALTH MAINTENANCE LETTER (OUTPATIENT)
Age: 41
End: 2021-06-26

## 2021-06-29 ENCOUNTER — RECORDS - HEALTHEAST (OUTPATIENT)
Dept: HEALTH INFORMATION MANAGEMENT | Facility: CLINIC | Age: 41
End: 2021-06-29

## 2021-06-30 NOTE — PROGRESS NOTES
"Progress Notes by Allison Gong RN at 1/20/2021  9:00 AM     Author: Allison Gong RN Service: -- Author Type: Registered Nurse    Filed: 1/20/2021  9:46 AM Encounter Date: 1/20/2021 Status: Signed    : Allison Gong RN (Registered Nurse)     Summary: novavax vaccine study          Study name PREVENT-19  Protocol Title:  A Phase 3, Randomized, Observer-Blinded, Placebo-Controlled Study to Evaluate the Efficacy, Safety, and Immunogenicity of a SARS-CoV-2 Recombinant Oliver Protein Nanoparticle Vaccine (SARS-CoV-2 rS) with Matrix-M1? Adjuvant in Adult Participants ? 18 Years     Research Allison Gong RN nurse associate spoke with Dhaval Austin and/or their family by phone to discuss participation in the Novavax vaccine study.    The study discussion continued with an introduction of the study purpose and the qualifications for participation.     The consent discussion included the following:    Description of trial    Number of Participants    Risks and Discomforts    Unforeseeable Risks    Benefits    Alternative Procedures or Treatments    Confidentiality    Compensation and Medical Treatments in Event of Injury    Contacts    Voluntary Participation    Involuntary Termination of Participant's Participation    Additional Costs to Participant    Consequences of Participant's Decision to Withdraw    Providing Significant New Findings to Participants      Dhaval Austin was provided time to consider participation and ask questions.  All questions answered to her satisfaction.  Dhaval Austin was queried regarding their understanding of the trial. she was able to correctly answer the following questions:    Double blind placebo control    Follow up visits    Need to report side effects and/or possible COVID-19 symptoms       Dhaval Austin has preliminarily agreed to participate in the \"PREVENT-19\" COVID-19 vaccine clinical trial.  she will sign consent form in person on 01- after in person " continuation of consent process. This visit is scheduled for 0800.    Allison Gong RN    A review of medical history and medications was done to screen for items that may impact ability to participate in the trial.     Most recent Influenza vaccine:  None   Must be >4 days prior to Novavax vaccine  Any other vaccines (name & dates):  None   Must be >7 days prior to Novavax vaccine    Medical History  Need: Start date (year @ minimum), end date (year @ minimum), or ongoing  Past Medical History:   Diagnosis Date   ? Anxiety    ? Cellulitis 2013, 6/2014   ? Diabetes mellitus (H)     Pre- diabetic   ? Hirsutism    ? HLD (hyperlipidemia)    ? Hypothyroid    ? Obesity    ? Polycystic ovarian disease        Hospitalized in last 6 months? [x] Yes   [] No  Hospitalized in June cellulitis of Right foot.    Woman of Childbearing Potential- delete if not applicable  What is the subject's child bearing potential? [] Sterile [] Post-Menopausal  [x] Potentially Able To Bear Children    If of Childbearing Potential, please confirm if subject is receiving a medically accepted form of birth control [] Yes   [x] No   Inform her that urine pregnancy test will be performed on day of visit      Allison Gong RN

## 2021-07-03 NOTE — ADDENDUM NOTE
Addendum Note by Mani He MD at 4/10/2017  7:46 AM     Author: Mani He MD Service: -- Author Type: Physician    Filed: 4/10/2017  7:46 AM Encounter Date: 4/10/2017 Status: Signed    : Mani He MD (Physician)    Addended by: MANI HE on: 4/10/2017 07:46 AM        Modules accepted: Orders

## 2021-07-22 ENCOUNTER — TRANSFERRED RECORDS (OUTPATIENT)
Dept: HEALTH INFORMATION MANAGEMENT | Facility: CLINIC | Age: 41
End: 2021-07-22

## 2021-08-03 ENCOUNTER — OFFICE VISIT (OUTPATIENT)
Dept: ALLERGY | Facility: CLINIC | Age: 41
End: 2021-08-03
Payer: COMMERCIAL

## 2021-08-03 VITALS
HEIGHT: 67 IN | HEART RATE: 76 BPM | BODY MASS INDEX: 30.92 KG/M2 | RESPIRATION RATE: 18 BRPM | WEIGHT: 197 LBS | OXYGEN SATURATION: 98 %

## 2021-08-03 DIAGNOSIS — J31.0 NONALLERGIC RHINITIS: ICD-10-CM

## 2021-08-03 DIAGNOSIS — T50.Z95D ADVERSE EFFECT OF VACCINE, SUBSEQUENT ENCOUNTER: ICD-10-CM

## 2021-08-03 DIAGNOSIS — Z01.82 ENCOUNTER FOR ALLERGY TESTING: Primary | ICD-10-CM

## 2021-08-03 PROBLEM — R65.20 SEVERE SEPSIS (H): Status: RESOLVED | Noted: 2019-10-19 | Resolved: 2020-02-02

## 2021-08-03 PROBLEM — A41.9 SEPSIS (H): Status: RESOLVED | Noted: 2020-06-16 | Resolved: 2020-06-24

## 2021-08-03 PROBLEM — A41.9 SEVERE SEPSIS (H): Status: RESOLVED | Noted: 2019-10-19 | Resolved: 2020-02-02

## 2021-08-03 PROBLEM — R65.10 SIRS (SYSTEMIC INFLAMMATORY RESPONSE SYNDROME) (H): Status: RESOLVED | Noted: 2020-06-16 | Resolved: 2020-06-24

## 2021-08-03 PROCEDURE — 99243 OFF/OP CNSLTJ NEW/EST LOW 30: CPT | Mod: 25 | Performed by: ALLERGY & IMMUNOLOGY

## 2021-08-03 PROCEDURE — 95004 PERQ TESTS W/ALRGNC XTRCS: CPT | Performed by: ALLERGY & IMMUNOLOGY

## 2021-08-03 ASSESSMENT — MIFFLIN-ST. JEOR: SCORE: 1581.22

## 2021-08-03 NOTE — LETTER
8/3/2021         RE: Dhaval Austin  [address] [facility] [address]        Dear Colleague,    Thank you for referring your patient, Dhaval Austin, to the [facility]. Unfortunately, her reaction to the vaccine is not an allergic reaction.  For this reason, I am unable to provide guidance regarding further vaccine administration.  Please see a copy of my visit note below.            Subjective       HPI chief complaint: Adverse reaction to Covid vaccine, suspected allergies    History of present illness: This is a pleasant 41-year-old woman I was asked to see for evaluation by Dr. Seaman in regards to allergies.  Patient states that in April, she received her first Pfizer vaccine.  Shortly after she developed sinus pressure and full body aches and pains.  She states it lasted about 3 weeks until she was due for her second vaccine.  She did not receive her second vaccine due to the symptoms.  She states she continues to have itchy eyes, runny nose and drainage.  She does not take any allergy medication.  No nasal rinses or sprays.  She states that last Wednesday noted similar symptoms as well.  She does note some tightness in her chest.  She states sometimes difficulty breathing.  She does have a history of panic disorder and states that she had a panic attack on Mother's Day that exacerbated the symptoms.  No history of asthma.  She denies any wheezing or cough.    Past medical history: Diabetes, anxiety    Social history: She lives in a home with central air and a basement, no recent changes at home, she has 2 cats, former smoker, no exposure to secondhand smoke    Family history: Mother with asthma and allergies, son with allergies         Review of Systems   Constitutional, HEENT, cardiovascular, pulmonary, GI, , musculoskeletal, neuro, skin, endocrine and psych systems are negative, except as otherwise noted.      Objective    Pulse 76   Resp 18   Ht 1.702 m  "(5' 7\")   Wt 89.4 kg (197 lb)   SpO2 98%   BMI 30.85 kg/m    Body mass index is 30.85 kg/m .  Physical Exam      Gen: Pleasant female not in acute distress  HEENT: Eyes no erythema of the bulbar or palpebral conjunctiva, no edema. Ears: No deformities or lesions nose: No congestion, mucosa normal. Mouth: Throat drainage, no lip or tongue edema.   Cardiac: Regular rate and rhythm, no murmurs, rubs or gallops  Respiratory: Clear to auscultation bilaterally, no adventitious breath sounds  Lymph: No visible supraclavicular or cervical lymphadenopathy  Skin: No rashes or lesions  Psych: Alert and oriented times 3      30 percutaneous test were placed environmental skin test panel, positive histamine control, negative allergy skin test.    Impression report and plan:  1.  Nonallergic rhinitis  2.  Adverse reaction to vaccine    I stated I am not an expert in non-IgE mediated reactions to vaccine.  I stated I unfortunately cannot provide any guidance can receive the vaccine in the future given that her symptoms are not consistent with allergy.  Recommended a trial of nasal rinses.  She had negative allergy test today.  If sinus symptoms continue, consider CT scan of the sinus or referral to ENT.      Again, thank you for allowing me to participate in the care of your patient.        Sincerely,        Vikki LARA MD    "

## 2021-08-03 NOTE — PROGRESS NOTES
"        Subjective       HPI chief complaint: Adverse reaction to Covid vaccine, suspected allergies    History of present illness: This is a pleasant 41-year-old woman I was asked to see for evaluation by Dr. Seaman in regards to allergies.  Patient states that in April, she received her first Pfizer vaccine.  Shortly after she developed sinus pressure and full body aches and pains.  She states it lasted about 3 weeks until she was due for her second vaccine.  She did not receive her second vaccine due to the symptoms.  She states she continues to have itchy eyes, runny nose and drainage.  She does not take any allergy medication.  No nasal rinses or sprays.  She states that last Wednesday noted similar symptoms as well.  She does note some tightness in her chest.  She states sometimes difficulty breathing.  She does have a history of panic disorder and states that she had a panic attack on Mother's Day that exacerbated the symptoms.  No history of asthma.  She denies any wheezing or cough.    Past medical history: Diabetes, anxiety    Social history: She lives in a home with central air and a basement, no recent changes at home, she has 2 cats, former smoker, no exposure to secondhand smoke    Family history: Mother with asthma and allergies, son with allergies         Review of Systems   Constitutional, HEENT, cardiovascular, pulmonary, GI, , musculoskeletal, neuro, skin, endocrine and psych systems are negative, except as otherwise noted.      Objective    Pulse 76   Resp 18   Ht 1.702 m (5' 7\")   Wt 89.4 kg (197 lb)   SpO2 98%   BMI 30.85 kg/m    Body mass index is 30.85 kg/m .  Physical Exam      Gen: Pleasant female not in acute distress  HEENT: Eyes no erythema of the bulbar or palpebral conjunctiva, no edema. Ears: No deformities or lesions nose: No congestion, mucosa normal. Mouth: Throat drainage, no lip or tongue edema.   Cardiac: Regular rate and rhythm, no murmurs, rubs or gallops  Respiratory: " Clear to auscultation bilaterally, no adventitious breath sounds  Lymph: No visible supraclavicular or cervical lymphadenopathy  Skin: No rashes or lesions  Psych: Alert and oriented times 3      30 percutaneous test were placed environmental skin test panel, positive histamine control, negative allergy skin test.    Impression report and plan:  1.  Nonallergic rhinitis  2.  Adverse reaction to vaccine    I stated I am not an expert in non-IgE mediated reactions to vaccine.  I stated I unfortunately cannot provide any guidance can receive the vaccine in the future given that her symptoms are not consistent with allergy.  Recommended a trial of nasal rinses.  She had negative allergy test today.  If sinus symptoms continue, consider CT scan of the sinus or referral to ENT.

## 2021-08-11 ENCOUNTER — OFFICE VISIT (OUTPATIENT)
Dept: FAMILY MEDICINE | Facility: CLINIC | Age: 41
End: 2021-08-11
Payer: COMMERCIAL

## 2021-08-11 ENCOUNTER — ANCILLARY PROCEDURE (OUTPATIENT)
Dept: GENERAL RADIOLOGY | Facility: CLINIC | Age: 41
End: 2021-08-11
Attending: FAMILY MEDICINE
Payer: COMMERCIAL

## 2021-08-11 VITALS
BODY MASS INDEX: 32.79 KG/M2 | RESPIRATION RATE: 20 BRPM | HEIGHT: 67 IN | SYSTOLIC BLOOD PRESSURE: 152 MMHG | DIASTOLIC BLOOD PRESSURE: 88 MMHG | HEART RATE: 83 BPM | TEMPERATURE: 98.5 F | OXYGEN SATURATION: 99 % | WEIGHT: 208.9 LBS

## 2021-08-11 DIAGNOSIS — R07.9 CHEST PAIN, UNSPECIFIED TYPE: ICD-10-CM

## 2021-08-11 DIAGNOSIS — R07.9 CHEST PAIN, UNSPECIFIED TYPE: Primary | ICD-10-CM

## 2021-08-11 DIAGNOSIS — R09.81 SINUS CONGESTION: ICD-10-CM

## 2021-08-11 DIAGNOSIS — F41.1 GENERALIZED ANXIETY DISORDER: ICD-10-CM

## 2021-08-11 PROCEDURE — 93005 ELECTROCARDIOGRAM TRACING: CPT | Performed by: FAMILY MEDICINE

## 2021-08-11 PROCEDURE — 71046 X-RAY EXAM CHEST 2 VIEWS: CPT | Mod: TC | Performed by: RADIOLOGY

## 2021-08-11 PROCEDURE — 93010 ELECTROCARDIOGRAM REPORT: CPT | Performed by: INTERNAL MEDICINE

## 2021-08-11 PROCEDURE — 99214 OFFICE O/P EST MOD 30 MIN: CPT | Performed by: FAMILY MEDICINE

## 2021-08-11 RX ORDER — FLUTICASONE PROPIONATE 50 MCG
2 SPRAY, SUSPENSION (ML) NASAL DAILY
Qty: 16 G | Refills: 1 | Status: SHIPPED | OUTPATIENT
Start: 2021-08-11 | End: 2022-04-04

## 2021-08-11 ASSESSMENT — MIFFLIN-ST. JEOR: SCORE: 1635.19

## 2021-08-11 NOTE — PATIENT INSTRUCTIONS
Our cardiology team will contact you to schedule a stress test.  This will be done to ensure your chest pain is not due to underlying heart disease.    Reflux could be contributing to your chest pain.  I recommend a 2-week trial of Prilosec once daily.    For your sinus pressure, I recommend you begin Flonase nasal spray 2 sprays each side of nose once daily.  This takes 5 to 7 days to build up in effect.  You may also use an over-the-counter antihistamine such as loratadine or cetirizine, and you could consider guaifenesin to help with your congestion.    Keep an eye on your anxiety.  It is unclear how much it may be contributing to your symptoms.  Consider seeing a therapist.  Therapist groups in the area:    [facility] ([place] and other [facility]) [phone]    [facility] ([place]) [phone]    [facility] ([place]) [phone]    [facility] ([place]) [phone]     Let me know if your anxiety is not improving significantly in the next few weeks as we assess your heart.  If you have onset of additional symptoms or concerns, let me know.

## 2021-08-11 NOTE — PROGRESS NOTES
Assessment/Plan:     Chest pain, unspecified type  We discussed broad differential diagnosis.  While I think risk of cardiac etiology is low, she has multiple risk factors and therefore I think the next step would be to perform stress test to evaluate for this.  Advised initiation of Prilosec once daily for 2 weeks as reflux could certainly be contributing.  Unclear how much anxiety could be contributing.  Will await radiology interpretation of chest x-ray.  - EKG 12-lead, tracing only  - XR Chest 2 Views; Future  - NM MPI Treadmill; Future    Sinus congestion  No evidence of acute sinus infection.  Advise symptomatic over-the-counter treatments including initiation of fluticasone nasal spray, prescription sent.  Consider antihistamine such as loratadine or cetirizine, and consider Mucinex as needed.  Avoid decongestant version.  Notify with persistence or worsening.  - fluticasone (FLONASE) 50 MCG/ACT nasal spray; Spray 2 sprays into both nostrils daily    Generalized anxiety disorder  Unclear how much this is contributing to her current symptoms but certainly present today and likely the cause of her elevation of blood pressure.  She will check her blood pressures at home intermittently.  Follow-up in 1 month to reassess pain information [place] regarding therapist, encouraged consideration of therapy treatments.      Patient Instructions   Our cardiology team will contact you to schedule a stress test.  This will be done to ensure your chest pain is not due to underlying heart disease.    Reflux could be contributing to your chest pain.  I recommend a 2-week trial of Prilosec once daily.    For your sinus pressure, I recommend you begin Flonase nasal spray 2 sprays each side of nose once daily.  This takes 5 to 7 days to build up in effect.  You may also use an over-the-counter antihistamine such as loratadine or cetirizine, and you could consider guaifenesin to help with your congestion.    Keep an eye on your  anxiety.  It is unclear how much it may be contributing to your symptoms.  Consider seeing a therapist.  Therapist groups in the area:    [facility] ([place] and other [facility]) [phone]    [facility] ([place]) [phone]    [facility] ([place]) [phone]    [facility] ([place]) [phone]     Let me know if your anxiety is not improving significantly in the next few weeks as we assess your heart.  If you have onset of additional symptoms or concerns, let me know.       Return in about 4 weeks (around 9/8/2021) for Follow up blood pressure, anxiety, diabetes.      Subjective:      Dhaval Austin is a 41 year old female presented to clinic today with a few concerns.  At her visit in May she had mentioned a single episode of chest pain that lasted about 4 days, notes that it continued intermittently occur.  Describes it as occurring across her chest and sometimes in her left upper arm.  Variable and that sometimes it will be brief, other times will last all day.  States it feels like a deep ache and sometimes like her breasts are tender.  Has not noted any activity triggers, in fact it oftentimes gets better if she is active when it is occurring.  Denies any dietary triggers.  Sometimes will feel like she cannot take deep enough breath.  Feels like anxiety makes it worse.  Denies any associated cough, overt shortness of breath, peripheral edema.  No change in activity tolerance.  Continues to have a low-carb diet, wonders if eating almonds and a protein shake in the morning might trigger it as it did not occur yesterday after eating eggs and an English muffin.  She is also noted ongoing sinus drainage, discussed with Dr. Bauer recently.  Describes pain in her temples and around her ears, palpating the area will lead to a sensation of postnasal drainage.  Seems to be worsening over the last few weeks.  No cough.  No fevers or chills.   No persistent or overt localized sinus tenderness.  No purulent sinus drainage.  Recent allergy testing negative.  Fluticasone nasal spray was recommended by Dr. Bauer but she has not yet initiated it.    History of anxiety, is noting that in general she has been more anxious of late both related to her physical symptoms and related to Covid pandemic and stress within the world and within her family.  She is been able to continue to maintain her sobriety.  Is not currently seeing a therapist or otherwise addressing her anxiety.  She is exercising on a regular basis often by playing football, basketball, or disc golf with her family.  Previously had been exercising on a more regular basis for about an hour, has not been doing so.    Current Outpatient Medications   Medication Sig Dispense Refill     ACCU-CHEK COMPACT TEST Strp [ACCU-CHEK COMPACT TEST STRP] Test daily before all meals/snacks and once before bedtime. 100 strip 11     aspirin 81 MG EC tablet [ASPIRIN 81 MG EC TABLET] Take 1 tablet (81 mg total) by mouth daily. 150 tablet 2     atorvastatin (LIPITOR) 20 MG tablet [ATORVASTATIN (LIPITOR) 20 MG TABLET] Take 1 tablet (20 mg total) by mouth daily. 90 tablet 2     blood-glucose meter (ACCU-CHEK JOÃO) Misc [BLOOD-GLUCOSE METER (ACCU-CHEK JOÃO) MISC] Use 1 Device As Directed daily. 1 each 1     cholecalciferol, vitamin D3, 1,000 unit (25 mcg) tablet [CHOLECALCIFEROL, VITAMIN D3, 1,000 UNIT (25 MCG) TABLET] Take 1,000 Units by mouth daily.       fluticasone (FLONASE) 50 MCG/ACT nasal spray Spray 2 sprays into both nostrils daily 16 g 1     generic lancets (ACCU-CHEK MULTICLIX LANCET) [GENERIC LANCETS (ACCU-CHEK MULTICLIX LANCET)] Test daily, varying times. 100 each 3     levothyroxine (SYNTHROID, LEVOTHROID) 150 MCG tablet [LEVOTHYROXINE (SYNTHROID, LEVOTHROID) 150 MCG TABLET] Take 1 tablet (150 mcg total) by mouth daily. 90 tablet 1     metFORMIN (FORTAMET) 500 MG (OSM) 24 hr tablet [METFORMIN (FORTAMET) 500 MG  (OSM) 24 HR TABLET] Take 2 tablets (1,000 mg total) by mouth daily with breakfast. 360 tablet 2     multivitamin therapeutic tablet [MULTIVITAMIN THERAPEUTIC TABLET] Take 1 tablet by mouth daily.       zinc gluconate 50 mg tablet [ZINC GLUCONATE 50 MG TABLET] Take 50 mg by mouth every other day.         Past Medical History, Family History, and Social History reviewed.  Past Medical History:   Diagnosis Date     Anxiety      Cellulitis , 2014     Diabetes mellitus (H)     Pre- diabetic     Hirsutism      HLD (hyperlipidemia)      Hypothyroid      Obesity      Polycystic ovarian disease      Past Surgical History:   Procedure Laterality Date     ABDOMEN SURGERY      C-Sections      SECTION  ,      Patient has no known allergies.  Family History   Problem Relation Age of Onset     Liver Cancer Father      Colon Cancer Father      Breast Cancer Maternal Grandmother      Social History     Socioeconomic History     Marital status:      Spouse name: Not on file     Number of children: Not on file     Years of education: Not on file     Highest education level: Not on file   Occupational History     Not on file   Tobacco Use     Smoking status: Former Smoker     Quit date: 2014     Years since quittin.4     Smokeless tobacco: Never Used     Tobacco comment: FORMAL SMOKER   Substance and Sexual Activity     Alcohol use: Not Currently     Alcohol/week: 1.0 standard drinks     Comment: Alcoholic Drinks/day: sober 7 months     Drug use: No     Sexual activity: Yes     Partners: Male     Birth control/protection: None   Other Topics Concern     Not on file   Social History Narrative     Not on file     Social Determinants of Health     Financial Resource Strain:      Difficulty of Paying Living Expenses:    Food Insecurity:      Worried About Running Out of Food in the Last Year:      Ran Out of Food in the Last Year:    Transportation Needs:      Lack of Transportation (Medical):       "Lack of Transportation (Non-Medical):    Physical Activity:      Days of Exercise per Week:      Minutes of Exercise per Session:    Stress:      Feeling of Stress :    Social Connections:      Frequency of Communication with Friends and Family:      Frequency of Social Gatherings with Friends and Family:      Attends Spiritism Services:      Active Member of Clubs or Organizations:      Attends Club or Organization Meetings:      Marital Status:    Intimate Partner Violence:      Fear of Current or Ex-Partner:      Emotionally Abused:      Physically Abused:      Sexually Abused:          Review of systems is as stated in HPI, and the remainder of the 10 system review is otherwise negative.    Objective:     Vitals:    08/11/21 0711 08/11/21 0716   BP: (!) 162/88 (!) 152/88   Pulse: 83    Resp: 20    Temp: 98.5  F (36.9  C)    TempSrc: Oral    SpO2: 99%    Weight: 94.8 kg (208 lb 14.4 oz)    Height: 1.702 m (5' 7\")     Body mass index is 32.72 kg/m .    Alert female.  Anxious.  Mucous membranes moist.  Heart with regular rate and rhythm.  Lungs clear.  Panic membranes pearly and translucent.  No focal sinus tenderness on exam today.  Abdomen is soft and nontender.  Extremities without edema.    I ordered and personally reviewed a twelve-lead EKG which reveals normal sinus rhythm, no ischemic or arrhythmic changes, unchanged from prior EKG May 2021.    I ordered and personally reviewed 2 view chest x-ray which shows no focal opacities or abnormalities.  Radiology interpretation pending.      This note has been dictated using voice recognition software. Any grammatical or context distortions are unintentional and inherent to the the software.     "

## 2021-08-13 LAB
ATRIAL RATE - MUSE: 77 BPM
DIASTOLIC BLOOD PRESSURE - MUSE: NORMAL MMHG
INTERPRETATION ECG - MUSE: NORMAL
P AXIS - MUSE: 30 DEGREES
PR INTERVAL - MUSE: 158 MS
QRS DURATION - MUSE: 82 MS
QT - MUSE: 386 MS
QTC - MUSE: 436 MS
R AXIS - MUSE: -7 DEGREES
SYSTOLIC BLOOD PRESSURE - MUSE: NORMAL MMHG
T AXIS - MUSE: 15 DEGREES
VENTRICULAR RATE- MUSE: 77 BPM

## 2021-08-19 ENCOUNTER — ANCILLARY PROCEDURE (OUTPATIENT)
Dept: MAMMOGRAPHY | Facility: CLINIC | Age: 41
End: 2021-08-19
Attending: FAMILY MEDICINE
Payer: COMMERCIAL

## 2021-08-19 DIAGNOSIS — Z00.00 ROUTINE GENERAL MEDICAL EXAMINATION AT A HEALTH CARE FACILITY: ICD-10-CM

## 2021-08-19 PROCEDURE — 77067 SCR MAMMO BI INCL CAD: CPT | Mod: TC | Performed by: INTERNAL MEDICINE

## 2021-08-19 PROCEDURE — 77063 BREAST TOMOSYNTHESIS BI: CPT | Mod: TC | Performed by: INTERNAL MEDICINE

## 2021-08-23 ENCOUNTER — HOSPITAL ENCOUNTER (OUTPATIENT)
Dept: NUCLEAR MEDICINE | Facility: HOSPITAL | Age: 41
End: 2021-08-23
Attending: FAMILY MEDICINE
Payer: COMMERCIAL

## 2021-08-23 ENCOUNTER — HOSPITAL ENCOUNTER (OUTPATIENT)
Dept: CARDIOLOGY | Facility: HOSPITAL | Age: 41
End: 2021-08-23
Attending: FAMILY MEDICINE
Payer: COMMERCIAL

## 2021-08-23 DIAGNOSIS — R07.9 CHEST PAIN, UNSPECIFIED TYPE: ICD-10-CM

## 2021-08-23 PROCEDURE — A9500 TC99M SESTAMIBI: HCPCS | Performed by: FAMILY MEDICINE

## 2021-08-23 PROCEDURE — 93018 CV STRESS TEST I&R ONLY: CPT | Performed by: INTERNAL MEDICINE

## 2021-08-23 PROCEDURE — 78452 HT MUSCLE IMAGE SPECT MULT: CPT

## 2021-08-23 PROCEDURE — 93016 CV STRESS TEST SUPVJ ONLY: CPT | Performed by: INTERNAL MEDICINE

## 2021-08-23 PROCEDURE — 343N000001 HC RX 343: Performed by: FAMILY MEDICINE

## 2021-08-23 PROCEDURE — 78452 HT MUSCLE IMAGE SPECT MULT: CPT | Mod: 26 | Performed by: INTERNAL MEDICINE

## 2021-08-23 RX ADMIN — Medication 8.63 MILLICURIE: at 07:36

## 2021-08-23 RX ADMIN — Medication 32.3 MILLICURIE: at 09:00

## 2021-10-11 ENCOUNTER — HEALTH MAINTENANCE LETTER (OUTPATIENT)
Age: 41
End: 2021-10-11

## 2021-10-14 DIAGNOSIS — E11.9 TYPE 2 DIABETES MELLITUS WITHOUT COMPLICATION, WITHOUT LONG-TERM CURRENT USE OF INSULIN (H): ICD-10-CM

## 2021-10-14 NOTE — TELEPHONE ENCOUNTER
Refill Request: aspirin 81 MG EC tablet    Last OV: 8/11/2021    Pending Prescriptions:                       Disp   Refills    aspirin (ASA) 81 MG EC tablet             150 ta*2            Sig: Take 1 tablet (81 mg) by mouth daily

## 2021-11-05 LAB
CV STRESS CURRENT BP HE: NORMAL
CV STRESS CURRENT HR HE: 116
CV STRESS CURRENT HR HE: 125
CV STRESS CURRENT HR HE: 132
CV STRESS CURRENT HR HE: 132
CV STRESS CURRENT HR HE: 136
CV STRESS CURRENT HR HE: 137
CV STRESS CURRENT HR HE: 145
CV STRESS CURRENT HR HE: 146
CV STRESS CURRENT HR HE: 147
CV STRESS CURRENT HR HE: 154
CV STRESS CURRENT HR HE: 154
CV STRESS CURRENT HR HE: 156
CV STRESS CURRENT HR HE: 156
CV STRESS CURRENT HR HE: 76
CV STRESS CURRENT HR HE: 83
CV STRESS CURRENT HR HE: 85
CV STRESS CURRENT HR HE: 86
CV STRESS CURRENT HR HE: 88
CV STRESS CURRENT HR HE: 89
CV STRESS CURRENT HR HE: 96
CV STRESS CURRENT HR HE: 97
CV STRESS DEVIATION TIME HE: NORMAL
CV STRESS ECHO PERCENT HR HE: NORMAL
CV STRESS EXERCISE STAGE HE: NORMAL
CV STRESS EXERCISE STAGE REACHED HE: NORMAL
CV STRESS FINAL RESTING BP HE: NORMAL
CV STRESS FINAL RESTING HR HE: 86
CV STRESS MAX HR HE: 157
CV STRESS MAX TREADMILL GRADE HE: 14
CV STRESS MAX TREADMILL SPEED HE: 3.4
CV STRESS PEAK DIA BP HE: NORMAL
CV STRESS PEAK SYS BP HE: NORMAL
CV STRESS PHASE HE: NORMAL
CV STRESS PROTOCOL HE: NORMAL
CV STRESS RESTING PT POSITION HE: NORMAL
CV STRESS RESTING PT POSITION HE: NORMAL
CV STRESS ST DEVIATION AMOUNT HE: NORMAL
CV STRESS ST DEVIATION ELEVATION HE: NORMAL
CV STRESS ST EVELATION AMOUNT HE: NORMAL
CV STRESS TEST TYPE HE: NORMAL
CV STRESS TOTAL STAGE TIME MIN 1 HE: NORMAL
NUC STRESS EJECTION FRACTION: 75 %
RATE PRESSURE PRODUCT: NORMAL
STRESS ECHO BASELINE DIASTOLIC HE: 70
STRESS ECHO BASELINE HR: 82
STRESS ECHO BASELINE SYSTOLIC BP: 130
STRESS ECHO CALCULATED PERCENT HR: 89 %
STRESS ECHO LAST STRESS DIASTOLIC BP: 98
STRESS ECHO LAST STRESS HR: 156
STRESS ECHO LAST STRESS SYSTOLIC BP: 212
STRESS ECHO POST ESTIMATED WORKLOAD: 8.5
STRESS ECHO POST EXERCISE DUR MIN: 7
STRESS ECHO POST EXERCISE DUR SEC: 2
STRESS ECHO TARGET HR: 177

## 2021-12-05 ENCOUNTER — HEALTH MAINTENANCE LETTER (OUTPATIENT)
Age: 41
End: 2021-12-05

## 2021-12-13 DIAGNOSIS — E11.9 TYPE 2 DIABETES MELLITUS WITHOUT COMPLICATION, WITHOUT LONG-TERM CURRENT USE OF INSULIN (H): Primary | ICD-10-CM

## 2021-12-15 ENCOUNTER — OFFICE VISIT (OUTPATIENT)
Dept: FAMILY MEDICINE | Facility: CLINIC | Age: 41
End: 2021-12-15
Payer: COMMERCIAL

## 2021-12-15 VITALS
HEART RATE: 83 BPM | OXYGEN SATURATION: 99 % | WEIGHT: 211.3 LBS | RESPIRATION RATE: 20 BRPM | SYSTOLIC BLOOD PRESSURE: 136 MMHG | HEIGHT: 67 IN | DIASTOLIC BLOOD PRESSURE: 86 MMHG | TEMPERATURE: 98 F | BODY MASS INDEX: 33.16 KG/M2

## 2021-12-15 DIAGNOSIS — F41.1 GENERALIZED ANXIETY DISORDER: ICD-10-CM

## 2021-12-15 DIAGNOSIS — E11.9 TYPE 2 DIABETES MELLITUS WITHOUT COMPLICATION, WITHOUT LONG-TERM CURRENT USE OF INSULIN (H): Primary | ICD-10-CM

## 2021-12-15 DIAGNOSIS — E06.3 HYPOTHYROIDISM DUE TO HASHIMOTO'S THYROIDITIS: ICD-10-CM

## 2021-12-15 DIAGNOSIS — R07.9 CHEST PAIN, UNSPECIFIED TYPE: ICD-10-CM

## 2021-12-15 LAB
HBA1C MFR BLD: 5.4 % (ref 0–5.6)
TSH SERPL DL<=0.005 MIU/L-ACNC: 3.5 UIU/ML (ref 0.3–5)

## 2021-12-15 PROCEDURE — 84443 ASSAY THYROID STIM HORMONE: CPT | Performed by: FAMILY MEDICINE

## 2021-12-15 PROCEDURE — 99214 OFFICE O/P EST MOD 30 MIN: CPT | Performed by: FAMILY MEDICINE

## 2021-12-15 PROCEDURE — 36415 COLL VENOUS BLD VENIPUNCTURE: CPT | Performed by: FAMILY MEDICINE

## 2021-12-15 PROCEDURE — 83036 HEMOGLOBIN GLYCOSYLATED A1C: CPT | Performed by: FAMILY MEDICINE

## 2021-12-15 RX ORDER — METFORMIN HCL 500 MG
TABLET, EXTENDED RELEASE 24 HR ORAL
Qty: 360 TABLET | Refills: 3 | Status: SHIPPED | OUTPATIENT
Start: 2021-12-15 | End: 2022-05-04

## 2021-12-15 ASSESSMENT — ANXIETY QUESTIONNAIRES
5. BEING SO RESTLESS THAT IT IS HARD TO SIT STILL: NEARLY EVERY DAY
3. WORRYING TOO MUCH ABOUT DIFFERENT THINGS: NEARLY EVERY DAY
2. NOT BEING ABLE TO STOP OR CONTROL WORRYING: NEARLY EVERY DAY
7. FEELING AFRAID AS IF SOMETHING AWFUL MIGHT HAPPEN: NEARLY EVERY DAY
1. FEELING NERVOUS, ANXIOUS, OR ON EDGE: NEARLY EVERY DAY
4. TROUBLE RELAXING: NEARLY EVERY DAY
6. BECOMING EASILY ANNOYED OR IRRITABLE: NEARLY EVERY DAY
GAD7 TOTAL SCORE: 21
7. FEELING AFRAID AS IF SOMETHING AWFUL MIGHT HAPPEN: NEARLY EVERY DAY
GAD7 TOTAL SCORE: 21
GAD7 TOTAL SCORE: 21

## 2021-12-15 ASSESSMENT — PATIENT HEALTH QUESTIONNAIRE - PHQ9
10. IF YOU CHECKED OFF ANY PROBLEMS, HOW DIFFICULT HAVE THESE PROBLEMS MADE IT FOR YOU TO DO YOUR WORK, TAKE CARE OF THINGS AT HOME, OR GET ALONG WITH OTHER PEOPLE: SOMEWHAT DIFFICULT
SUM OF ALL RESPONSES TO PHQ QUESTIONS 1-9: 12
SUM OF ALL RESPONSES TO PHQ QUESTIONS 1-9: 12

## 2021-12-15 ASSESSMENT — MIFFLIN-ST. JEOR: SCORE: 1646.08

## 2021-12-15 NOTE — TELEPHONE ENCOUNTER
"Routing refill request to provider for review/approval because:  Labs not current:  A1C, LAST DONE 5/17/21.     Last Written Prescription Date:  5/17/21  Last Fill Quantity: 360,  # refills: 2   Last office visit provider:  5/17/21, physical.     Requested Prescriptions   Pending Prescriptions Disp Refills     metFORMIN (GLUCOPHAGE-XR) 500 MG 24 hr tablet [Pharmacy Med Name: METFORMIN ER 500MG 24HR TABS] 360 tablet      Sig: TAKE 2 TABLETS BY MOUTH TWICE DAILY WITH MEALS       Biguanide Agents Passed - 12/13/2021  8:43 AM        Passed - Patient is age 10 or older        Passed - Recent (12 mo) or future (30 days) visit within the authorizing provider's specialty      Patient has had an office visit with the authorizing provider or a provider within the authorizing providers department within the previous 12 mos or has a future within next 30 days. See \"Patient Info\" tab in inbasket, or \"Choose Columns\" in Meds & Orders section of the refill encounter.              Passed - Patient does NOT have a diagnosis of CHF.        Passed - Medication is active on med list        Passed - Patient is not pregnant        Passed - Patient has not had a positive pregnancy test within the past 12 mos.              Shabnam Ceron RN 12/15/21 8:16 AM  "

## 2021-12-16 ASSESSMENT — PATIENT HEALTH QUESTIONNAIRE - PHQ9: SUM OF ALL RESPONSES TO PHQ QUESTIONS 1-9: 12

## 2021-12-16 ASSESSMENT — ANXIETY QUESTIONNAIRES: GAD7 TOTAL SCORE: 21

## 2021-12-29 NOTE — PROGRESS NOTES
Assessment/Plan:     Type 2 diabetes mellitus without complication, without long-term current use of insulin (H)  A1c remains adequately controlled through dietary measures.  Encouraged her in these efforts.  Encouraged regular physical activity as tolerated.  - Hemoglobin A1c; Standing  - Hemoglobin A1c    Hypothyroidism  Clinically euthyroid.  Continue levothyroxine.  Will check follow-up TSH today.  - TSH    Chest pain, unspecified type  I will suspicious that this is related to anxiety versus potentially some costochondritis though less likely.  It has certainly been frustrating for her.  Less likely to be reflux, though having other reflux symptoms intermittently.  Advised omeprazole.  Consider evaluation and treatment of costochondritis with more steady ibuprofen intake.  Reassurance provided that cardiac assessment is unremarkable.  Discussed importance of managing anxiety.    Generalized anxiety disorder  Encouraged continued efforts at healthy lifestyle habits.  Consider seeing a therapist.  Encourage consideration of daily medication to treat anxiety to see if this makes a difference as well.    Pain in feet and upper arms  Etiology is not clear.  Prior evaluation unremarkable.  We will recheck TSH today.  Continue symptomatic treatments.      There are no Patient Instructions on file for this visit.     No follow-ups on file.      Subjective:      Dhaval Austin is a 41 year old female presenting to clinic today for follow-up of anxiety and depression, chest pain, flu shot, and to discuss the Covid shot.  Describes significant bone pain after the first Covid vaccine that lasted several months.  Continues to have some pain in her feet and upper arms, feeling more heavy.  No dysfunction.  She notes continued central chest pain usually will begin in the sternum and then radiate towards her left upper chest and arm.  Oftentimes last days and is very painful and overwhelming when it occurs frustrated by  "this and feeling \"stuck\".  Notes that eating and drinking tends to make it worse.  Intermittently will feel like she is breathing through a straw especially after eating, will feel little swollen in her throat, will feel like it is harder to swallow.  She has a fear of choking though she is never choked or had a history of choking.  Omeprazole has been somewhat helpful, has taken for up to 2 weeks at a time.  Symptoms never entirely resolved.  She is try some dietary changes including eliminating soda, caffeine, and tomato sauces.  Ibuprofen sometimes is helpful, heating pad sometimes helpful both for the chest pain and the reflux symptoms.  In general her sleep remains poor, ebbs and flows.  Overall feels better when she is active.  She has been reading more, meditating.  She not currently seeing a therapist.    Current Outpatient Medications   Medication Sig Dispense Refill     ACCU-CHEK COMPACT TEST Strp [ACCU-CHEK COMPACT TEST STRP] Test daily before all meals/snacks and once before bedtime. 100 strip 11     aspirin (ASA) 81 MG EC tablet Take 1 tablet (81 mg) by mouth daily 150 tablet 2     atorvastatin (LIPITOR) 20 MG tablet [ATORVASTATIN (LIPITOR) 20 MG TABLET] Take 1 tablet (20 mg total) by mouth daily. 90 tablet 2     blood-glucose meter (ACCU-CHEK JOÃO) Misc [BLOOD-GLUCOSE METER (ACCU-CHEK JOÃO) MISC] Use 1 Device As Directed daily. 1 each 1     cholecalciferol, vitamin D3, 1,000 unit (25 mcg) tablet [CHOLECALCIFEROL, VITAMIN D3, 1,000 UNIT (25 MCG) TABLET] Take 1,000 Units by mouth daily.       fluticasone (FLONASE) 50 MCG/ACT nasal spray Spray 2 sprays into both nostrils daily 16 g 1     generic lancets (ACCU-CHEK MULTICLIX LANCET) [GENERIC LANCETS (ACCU-CHEK MULTICLIX LANCET)] Test daily, varying times. 100 each 3     levothyroxine (SYNTHROID, LEVOTHROID) 150 MCG tablet [LEVOTHYROXINE (SYNTHROID, LEVOTHROID) 150 MCG TABLET] Take 1 tablet (150 mcg total) by mouth daily. 90 tablet 1     metFORMIN " (FORTAMET) 500 MG (OSM) 24 hr tablet [METFORMIN (FORTAMET) 500 MG (OSM) 24 HR TABLET] Take 2 tablets (1,000 mg total) by mouth daily with breakfast. 360 tablet 2     multivitamin therapeutic tablet [MULTIVITAMIN THERAPEUTIC TABLET] Take 1 tablet by mouth daily.       zinc gluconate 50 mg tablet [ZINC GLUCONATE 50 MG TABLET] Take 50 mg by mouth every other day.       metFORMIN (GLUCOPHAGE-XR) 500 MG 24 hr tablet TAKE 2 TABLETS BY MOUTH TWICE DAILY WITH MEALS 360 tablet 3       Past Medical History, Family History, and Social History reviewed.  Past Medical History:   Diagnosis Date     Anxiety      Cellulitis , 2014     Diabetes mellitus (H) 2014    Pre- diabetic     Hirsutism      HLD (hyperlipidemia)      Hypothyroid      Obesity      Polycystic ovarian disease      Past Surgical History:   Procedure Laterality Date     ABDOMEN SURGERY      C-Sections      SECTION  ,      Patient has no known allergies.  Family History   Problem Relation Age of Onset     Liver Cancer Father      Colon Cancer Father      Breast Cancer Maternal Grandmother      Social History     Socioeconomic History     Marital status:      Spouse name: Not on file     Number of children: Not on file     Years of education: Not on file     Highest education level: Not on file   Occupational History     Not on file   Tobacco Use     Smoking status: Former Smoker     Quit date: 2014     Years since quittin.8     Smokeless tobacco: Never Used     Tobacco comment: FORMAL SMOKER   Substance and Sexual Activity     Alcohol use: Not Currently     Alcohol/week: 1.0 standard drink     Comment: Alcoholic Drinks/day: sober 7 months     Drug use: No     Sexual activity: Yes     Partners: Male     Birth control/protection: None   Other Topics Concern     Not on file   Social History Narrative     Not on file     Social Determinants of Health     Financial Resource Strain: Not on file   Food Insecurity: Not on file  "  Transportation Needs: Not on file   Physical Activity: Not on file   Stress: Not on file   Social Connections: Not on file   Intimate Partner Violence: Not on file   Housing Stability: Not on file         Review of systems is as stated in HPI, and the remainder of the 10 system review is otherwise negative.    Objective:     Vitals:    12/15/21 1544   BP: 136/86   Pulse: 83   Resp: 20   Temp: 98  F (36.7  C)   TempSrc: Temporal   SpO2: 99%   Weight: 95.8 kg (211 lb 4.8 oz)   Height: 1.702 m (5' 7\")    Body mass index is 33.09 kg/m .    Alert female.  Mildly anxious.  Thought processes and judgment intact.  Heart with regular rate and rhythm.  Lungs clear.  No focal tenderness palpation of her chest wall.  Abdomen soft and nontender.  Extremities without edema.      This note has been dictated using voice recognition software. Any grammatical or context distortions are unintentional and inherent to the the software.     "

## 2022-01-06 ENCOUNTER — MYC MEDICAL ADVICE (OUTPATIENT)
Dept: FAMILY MEDICINE | Facility: CLINIC | Age: 42
End: 2022-01-06
Payer: COMMERCIAL

## 2022-01-06 DIAGNOSIS — R93.89 ABNORMAL CHEST CT: Primary | ICD-10-CM

## 2022-01-12 DIAGNOSIS — R07.9 CHEST PAIN, UNSPECIFIED TYPE: Primary | ICD-10-CM

## 2022-01-19 ENCOUNTER — HOSPITAL ENCOUNTER (OUTPATIENT)
Dept: CT IMAGING | Facility: HOSPITAL | Age: 42
Discharge: HOME | End: 2022-01-19
Attending: FAMILY MEDICINE | Admitting: FAMILY MEDICINE
Payer: COMMERCIAL

## 2022-01-19 ENCOUNTER — TELEPHONE (OUTPATIENT)
Dept: FAMILY MEDICINE | Facility: CLINIC | Age: 42
End: 2022-01-19

## 2022-01-19 DIAGNOSIS — R07.9 CHEST PAIN, UNSPECIFIED TYPE: ICD-10-CM

## 2022-01-19 LAB
CREAT BLD-MCNC: 1 MG/DL (ref 0.6–1.1)
GFR SERPL CREATININE-BSD FRML MDRD: >60 ML/MIN/1.73M2
RADIOLOGIST FLAGS: ABNORMAL

## 2022-01-19 PROCEDURE — 82565 ASSAY OF CREATININE: CPT

## 2022-01-19 PROCEDURE — 71275 CT ANGIOGRAPHY CHEST: CPT

## 2022-01-19 PROCEDURE — 250N000011 HC RX IP 250 OP 636: Performed by: FAMILY MEDICINE

## 2022-01-19 RX ORDER — IOPAMIDOL 755 MG/ML
100 INJECTION, SOLUTION INTRAVASCULAR ONCE
Status: COMPLETED | OUTPATIENT
Start: 2022-01-19 | End: 2022-01-19

## 2022-01-19 RX ADMIN — IOPAMIDOL 100 ML: 755 INJECTION, SOLUTION INTRAVENOUS at 07:08

## 2022-01-19 NOTE — TELEPHONE ENCOUNTER
I spoke with patient by phone and reviewed CT scan findings.  No pulmonary embolism or pulmonary condition identified.  Possible right ventricular enlargement, will obtain echocardiogram to assess further.  Abnormality in the appearance of head of pancreas, will evaluate further with MRI.  Letter provided to patient reflecting that we are performing further medical evaluation prior to administration of second COVID-vaccine  VJ

## 2022-01-19 NOTE — TELEPHONE ENCOUNTER
Darwin from imaging calling needing Dr. Seaman to call back and acknowledge the urgent findings from CT of chest preformed today.     Please call [phone]

## 2022-01-27 ENCOUNTER — HOSPITAL ENCOUNTER (OUTPATIENT)
Dept: CARDIOLOGY | Facility: CLINIC | Age: 42
Discharge: HOME | End: 2022-01-27
Attending: FAMILY MEDICINE | Admitting: FAMILY MEDICINE
Payer: COMMERCIAL

## 2022-01-27 DIAGNOSIS — R93.89 ABNORMAL CHEST CT: ICD-10-CM

## 2022-01-27 PROCEDURE — 93306 TTE W/DOPPLER COMPLETE: CPT | Mod: 26 | Performed by: INTERNAL MEDICINE

## 2022-01-27 PROCEDURE — 93306 TTE W/DOPPLER COMPLETE: CPT

## 2022-01-28 ENCOUNTER — HOSPITAL ENCOUNTER (OUTPATIENT)
Dept: MRI IMAGING | Facility: CLINIC | Age: 42
Discharge: HOME | End: 2022-01-28
Attending: FAMILY MEDICINE | Admitting: FAMILY MEDICINE
Payer: COMMERCIAL

## 2022-01-28 DIAGNOSIS — R93.89 ABNORMAL CHEST CT: ICD-10-CM

## 2022-01-28 PROCEDURE — 74183 MRI ABD W/O CNTR FLWD CNTR: CPT

## 2022-01-28 PROCEDURE — 255N000002 HC RX 255 OP 636: Performed by: FAMILY MEDICINE

## 2022-01-28 PROCEDURE — A9585 GADOBUTROL INJECTION: HCPCS | Performed by: FAMILY MEDICINE

## 2022-01-28 RX ORDER — GADOBUTROL 604.72 MG/ML
10 INJECTION INTRAVENOUS ONCE
Status: COMPLETED | OUTPATIENT
Start: 2022-01-28 | End: 2022-01-28

## 2022-01-28 RX ADMIN — GADOBUTROL 10 ML: 604.72 INJECTION INTRAVENOUS at 13:32

## 2022-02-02 DIAGNOSIS — R07.9 CHEST PAIN, UNSPECIFIED TYPE: Primary | ICD-10-CM

## 2022-04-04 ENCOUNTER — OFFICE VISIT (OUTPATIENT)
Dept: CARDIOLOGY | Facility: CLINIC | Age: 42
End: 2022-04-04
Payer: COMMERCIAL

## 2022-04-04 VITALS
HEIGHT: 67 IN | WEIGHT: 211 LBS | HEART RATE: 80 BPM | BODY MASS INDEX: 33.12 KG/M2 | DIASTOLIC BLOOD PRESSURE: 82 MMHG | RESPIRATION RATE: 16 BRPM | SYSTOLIC BLOOD PRESSURE: 138 MMHG

## 2022-04-04 DIAGNOSIS — R07.9 CHEST PAIN, UNSPECIFIED TYPE: ICD-10-CM

## 2022-04-04 PROCEDURE — 99244 OFF/OP CNSLTJ NEW/EST MOD 40: CPT | Performed by: INTERNAL MEDICINE

## 2022-04-04 NOTE — PROGRESS NOTES
Thank you, Dr. Robles, for asking [facility] to evaluate Ms. Dhaval Austin.      Assessment/Recommendations   Assessment:    Chest pain, left-sided, atypical features-most likely musculoskeletal  History of alcohol abuse, in remission  Diabetes mellitus  Hypothyroidism  Anxiety    Plan:  I went over the results of recent cardiac testing as well as her normal physical exam.  I reassured her that her chest pain is noncardiac and she does not require additional cardiac testing.  She felt relieved.       History of Present Illness    Ms. Dhaval Austin is a 42 year old female who comes in for cardiac evaluation.  She has had left-sided chest pain on and off for about a year now.  The pain is nonexertional and nonpleuritic.  The pain can go on for hours and days without relief.  She cannot identify any triggers or relieving factors.  She denies positional features of the chest pain.  She has not had any injuries to the chest although she thinks that the pain started after she began doing upper body  exercises.  She does not have any associated shortness of breath or heart palpitations.  She denies history of known heart disease.  She underwent echo EKG and stress test within the last year.  Those were normal.    ECG: Personally reviewed.  Normal sinus rhythm with normal limits    Echocardiogram: January 2022  1. Normal left ventricular size and systolic performance with a visually  estimated ejection fraction of 65%.  2. No significant valvular heart disease is identified on this study.  3. Normal right ventricular size and systolic performance.    Stress Test: August 2021     1.The patient's exercise capacity is mildly impaired achieving 8.5 METS on the standard Honorio protocol.     2.Adequate negative exercise ECG for ischemia after 7 minutes on the standard Honorio protocol with patient developing shortness of breath, blood pressure 212/98, and 89% of age-predicted maximal heart rate.      "3.The nuclear stress test is negative for inducible myocardial ischemia or infarction.     4.The left ventricular ejection fraction at stress is 75%.     5.There is no prior study for comparison.    CT chest: January 2022 negative     Physical Examination Review of Systems   /82 (BP Location: Right arm, Patient Position: Sitting, Cuff Size: Adult Large)   Pulse 80   Resp 16   Ht 1.702 m (5' 7\")   Wt 95.7 kg (211 lb)   BMI 33.05 kg/m    Body mass index is 33.05 kg/m .  Wt Readings from Last 3 Encounters:   04/04/22 95.7 kg (211 lb)   12/15/21 95.8 kg (211 lb 4.8 oz)   08/11/21 94.8 kg (208 lb 14.4 oz)     General Appearance:   Alert, cooperative, no distress, appears stated age   Head/ENT: Normocephalic, without obvious abnormality. Membranes moist      EYES:  no scleral icterus, normal conjunctivae   Neck: Supple, symmetrical, trachea midline, no adenopathy, thyroid: not enlarged, symmetric, no carotid bruit or JVD   Chest/Lungs:   Lungs are clear to auscultation, respirations unlabored. No tenderness or deformity    Cardiovascular:   Regular rhythm, S1, S2 normal, no murmur, rub or gallop.   Abdomen:  Soft, non-tender, bowel sounds active all four quadrants,  no masses, no organomegaly   Extremities: no cyanosis or clubbing. No edema   Skin: Skin color, texture, turgor normal, no rashes or lesions.    Psychiatric: Normal affect, calm   Neurologic: Alert and oriented x 3, moving all four extremities.     Enc Vitals  BP: 138/82  Pulse: 80  Resp: 16  Weight: 95.7 kg (211 lb)  Height: 170.2 cm (5' 7\")                                          Lab Results    Chemistry/lipid CBC Cardiac Enzymes/BNP/TSH/INR   Recent Labs   Lab Test 05/17/21  0900   CHOL 157   HDL 56   LDL 85   TRIG 80     Recent Labs   Lab Test 05/17/21  0900 10/20/20  0735 02/03/20  1021   LDL 85 72 204*     Recent Labs   Lab Test 01/19/22  0658 05/17/21  0900   NA  --  140   POTASSIUM  --  4.4   CHLORIDE  --  104   CO2  --  23   GLC  --  121 "   BUN  --  14   CR 1.0 0.87   GFRESTIMATED >60 >60   MELL  --  9.3     Recent Labs   Lab Test 22  0658 21  0900 10/20/20  0735   CR 1.0 0.87 0.83     Recent Labs   Lab Test 12/15/21  1645 21  0819 10/20/20  0735   A1C 5.4 5.3 5.8*          Recent Labs   Lab Test 20  1028   WBC 6.3   HGB 12.4   HCT 34.5*   MCV 94        Recent Labs   Lab Test 20  1028 20  0505 20  0539   HGB 12.4 11.9* 13.1    No results for input(s): TROPONINI in the last 14327 hours.  No results for input(s): BNP, NTBNPI, NTBNP in the last 12060 hours.  Recent Labs   Lab Test 12/15/21  1645   TSH 3.50     No results for input(s): INR in the last 34157 hours.     Medical History  Surgical History Family History Social History   Past Medical History:   Diagnosis Date     Anxiety      Cellulitis , 2014     Diabetes mellitus (H) 2014    Pre- diabetic     Hirsutism      HLD (hyperlipidemia)      Hypothyroid      Obesity      Polycystic ovarian disease      Past Surgical History:   Procedure Laterality Date     ABDOMEN SURGERY      C-Sections      SECTION  ,      No premature CAD, SCD,cardiomyopathy   Social History     Socioeconomic History     Marital status:      Spouse name: Not on file     Number of children: Not on file     Years of education: Not on file     Highest education level: Not on file   Occupational History     Not on file   Tobacco Use     Smoking status: Former Smoker     Quit date: 2014     Years since quittin.1     Smokeless tobacco: Never Used     Tobacco comment: FORMAL SMOKER   Substance and Sexual Activity     Alcohol use: Not Currently     Alcohol/week: 1.0 standard drink     Comment: Alcoholic Drinks/day: sober 7 months     Drug use: No     Sexual activity: Yes     Partners: Male     Birth control/protection: None   Other Topics Concern     Not on file   Social History Narrative     Not on file     Social Determinants of Health     Financial  Resource Strain: Not on file   Food Insecurity: Not on file   Transportation Needs: Not on file   Physical Activity: Not on file   Stress: Not on file   Social Connections: Not on file   Intimate Partner Violence: Not on file   Housing Stability: Not on file         Medications  Allergies   Current Outpatient Medications   Medication Sig Dispense Refill     ACCU-CHEK COMPACT TEST Strp [ACCU-CHEK COMPACT TEST STRP] Test daily before all meals/snacks and once before bedtime. 100 strip 11     blood-glucose meter (ACCU-CHEK JOÃO) Misc [BLOOD-GLUCOSE METER (ACCU-CHEK JOÃO) MISC] Use 1 Device As Directed daily. 1 each 1     cholecalciferol, vitamin D3, 1,000 unit (25 mcg) tablet [CHOLECALCIFEROL, VITAMIN D3, 1,000 UNIT (25 MCG) TABLET] Take 1,000 Units by mouth daily.       generic lancets (ACCU-CHEK MULTICLIX LANCET) [GENERIC LANCETS (ACCU-CHEK MULTICLIX LANCET)] Test daily, varying times. 100 each 3     levothyroxine (SYNTHROID, LEVOTHROID) 150 MCG tablet [LEVOTHYROXINE (SYNTHROID, LEVOTHROID) 150 MCG TABLET] Take 1 tablet (150 mcg total) by mouth daily. 90 tablet 1     metFORMIN (GLUCOPHAGE-XR) 500 MG 24 hr tablet TAKE 2 TABLETS BY MOUTH TWICE DAILY WITH MEALS (Patient taking differently: Take 500 mg by mouth daily (with dinner) ) 360 tablet 3     multivitamin therapeutic tablet [MULTIVITAMIN THERAPEUTIC TABLET] Take 1 tablet by mouth daily.       zinc gluconate 50 mg tablet [ZINC GLUCONATE 50 MG TABLET] Take 50 mg by mouth every other day.        No Known Allergies

## 2022-04-04 NOTE — PATIENT INSTRUCTIONS
Dhaval Austin,    It was a pleasure to see you today at the [facility].     My recommendations after this visit include:    Reassurance.  Your pain does not appear to be related to any cardiac condition.    PADMAJA Hawkins MD, FACC, [facility]

## 2022-04-04 NOTE — LETTER
4/4/2022    Ramya Seaman MD  [address]  [facility] [address]    RE: Dhaval Austin       Dear Colleague,     I had the pleasure of seeing Dhaval Austin in the [facility].        Thank you, Dr. Robles, for asking [facility] to evaluate Ms. Dhaval Austin.      Assessment/Recommendations   Assessment:    Chest pain, left-sided, atypical features-most likely musculoskeletal  History of alcohol abuse, in remission  Diabetes mellitus  Hypothyroidism  Anxiety    Plan:  I went over the results of recent cardiac testing as well as her normal physical exam.  I reassured her that her chest pain is noncardiac and she does not require additional cardiac testing.  She felt relieved.       History of Present Illness    Ms. Dhaval Austin is a 42 year old female who comes in for cardiac evaluation.  She has had left-sided chest pain on and off for about a year now.  The pain is nonexertional and nonpleuritic.  The pain can go on for hours and days without relief.  She cannot identify any triggers or relieving factors.  She denies positional features of the chest pain.  She has not had any injuries to the chest although she thinks that the pain started after she began doing upper body  exercises.  She does not have any associated shortness of breath or heart palpitations.  She denies history of known heart disease.  She underwent echo EKG and stress test within the last year.  Those were normal.    ECG: Personally reviewed.  Normal sinus rhythm with normal limits    Echocardiogram: January 2022  1. Normal left ventricular size and systolic performance with a visually  estimated ejection fraction of 65%.  2. No significant valvular heart disease is identified on this study.  3. Normal right ventricular size and systolic performance.    Stress Test: August 2021     1.The patient's exercise capacity is mildly impaired achieving 8.5 METS on the standard Honorio protocol.      "2.Adequate negative exercise ECG for ischemia after 7 minutes on the standard Honorio protocol with patient developing shortness of breath, blood pressure 212/98, and 89% of age-predicted maximal heart rate.     3.The nuclear stress test is negative for inducible myocardial ischemia or infarction.     4.The left ventricular ejection fraction at stress is 75%.     5.There is no prior study for comparison.    CT chest: January 2022 negative     Physical Examination Review of Systems   /82 (BP Location: Right arm, Patient Position: Sitting, Cuff Size: Adult Large)   Pulse 80   Resp 16   Ht 1.702 m (5' 7\")   Wt 95.7 kg (211 lb)   BMI 33.05 kg/m    Body mass index is 33.05 kg/m .  Wt Readings from Last 3 Encounters:   04/04/22 95.7 kg (211 lb)   12/15/21 95.8 kg (211 lb 4.8 oz)   08/11/21 94.8 kg (208 lb 14.4 oz)     General Appearance:   Alert, cooperative, no distress, appears stated age   Head/ENT: Normocephalic, without obvious abnormality. Membranes moist      EYES:  no scleral icterus, normal conjunctivae   Neck: Supple, symmetrical, trachea midline, no adenopathy, thyroid: not enlarged, symmetric, no carotid bruit or JVD   Chest/Lungs:   Lungs are clear to auscultation, respirations unlabored. No tenderness or deformity    Cardiovascular:   Regular rhythm, S1, S2 normal, no murmur, rub or gallop.   Abdomen:  Soft, non-tender, bowel sounds active all four quadrants,  no masses, no organomegaly   Extremities: no cyanosis or clubbing. No edema   Skin: Skin color, texture, turgor normal, no rashes or lesions.    Psychiatric: Normal affect, calm   Neurologic: Alert and oriented x 3, moving all four extremities.     Enc Vitals  BP: 138/82  Pulse: 80  Resp: 16  Weight: 95.7 kg (211 lb)  Height: 170.2 cm (5' 7\")                                          Lab Results    Chemistry/lipid CBC Cardiac Enzymes/BNP/TSH/INR   Recent Labs   Lab Test 05/17/21  0900   CHOL 157   HDL 56   LDL 85   TRIG 80     Recent Labs   Lab " Test 21  0900 10/20/20  0735 20  1021   LDL 85 72 204*     Recent Labs   Lab Test 22  0658 21  0900   NA  --  140   POTASSIUM  --  4.4   CHLORIDE  --  104   CO2  --  23   GLC  --  121   BUN  --  14   CR 1.0 0.87   GFRESTIMATED >60 >60   MELL  --  9.3     Recent Labs   Lab Test 22  0658 21  0900 10/20/20  0735   CR 1.0 0.87 0.83     Recent Labs   Lab Test 12/15/21  1645 21  0819 10/20/20  0735   A1C 5.4 5.3 5.8*          Recent Labs   Lab Test 20  1028   WBC 6.3   HGB 12.4   HCT 34.5*   MCV 94        Recent Labs   Lab Test 20  1028 20  0505 20  0539   HGB 12.4 11.9* 13.1    No results for input(s): TROPONINI in the last 75525 hours.  No results for input(s): BNP, NTBNPI, NTBNP in the last 45990 hours.  Recent Labs   Lab Test 12/15/21  1645   TSH 3.50     No results for input(s): INR in the last 87296 hours.     Medical History  Surgical History Family History Social History   Past Medical History:   Diagnosis Date     Anxiety      Cellulitis , 2014     Diabetes mellitus (H) 2014    Pre- diabetic     Hirsutism      HLD (hyperlipidemia)      Hypothyroid      Obesity      Polycystic ovarian disease      Past Surgical History:   Procedure Laterality Date     ABDOMEN SURGERY      C-Sections      SECTION  ,      No premature CAD, SCD,cardiomyopathy   Social History     Socioeconomic History     Marital status:      Spouse name: Not on file     Number of children: Not on file     Years of education: Not on file     Highest education level: Not on file   Occupational History     Not on file   Tobacco Use     Smoking status: Former Smoker     Quit date: 2014     Years since quittin.1     Smokeless tobacco: Never Used     Tobacco comment: FORMAL SMOKER   Substance and Sexual Activity     Alcohol use: Not Currently     Alcohol/week: 1.0 standard drink     Comment: Alcoholic Drinks/day: sober 7 months     Drug use: No      Sexual activity: Yes     Partners: Male     Birth control/protection: None   Other Topics Concern     Not on file   Social History Narrative     Not on file     Social Determinants of Health     Financial Resource Strain: Not on file   Food Insecurity: Not on file   Transportation Needs: Not on file   Physical Activity: Not on file   Stress: Not on file   Social Connections: Not on file   Intimate Partner Violence: Not on file   Housing Stability: Not on file         Medications  Allergies   Current Outpatient Medications   Medication Sig Dispense Refill     ACCU-CHEK COMPACT TEST Strp [ACCU-CHEK COMPACT TEST STRP] Test daily before all meals/snacks and once before bedtime. 100 strip 11     blood-glucose meter (ACCU-CHEK JOÃO) Misc [BLOOD-GLUCOSE METER (ACCU-CHEK JOÃO) MISC] Use 1 Device As Directed daily. 1 each 1     cholecalciferol, vitamin D3, 1,000 unit (25 mcg) tablet [CHOLECALCIFEROL, VITAMIN D3, 1,000 UNIT (25 MCG) TABLET] Take 1,000 Units by mouth daily.       generic lancets (ACCU-CHEK MULTICLIX LANCET) [GENERIC LANCETS (ACCU-CHEK MULTICLIX LANCET)] Test daily, varying times. 100 each 3     levothyroxine (SYNTHROID, LEVOTHROID) 150 MCG tablet [LEVOTHYROXINE (SYNTHROID, LEVOTHROID) 150 MCG TABLET] Take 1 tablet (150 mcg total) by mouth daily. 90 tablet 1     metFORMIN (GLUCOPHAGE-XR) 500 MG 24 hr tablet TAKE 2 TABLETS BY MOUTH TWICE DAILY WITH MEALS (Patient taking differently: Take 500 mg by mouth daily (with dinner) ) 360 tablet 3     multivitamin therapeutic tablet [MULTIVITAMIN THERAPEUTIC TABLET] Take 1 tablet by mouth daily.       zinc gluconate 50 mg tablet [ZINC GLUCONATE 50 MG TABLET] Take 50 mg by mouth every other day.        No Known Allergies

## 2022-05-04 ENCOUNTER — OFFICE VISIT (OUTPATIENT)
Dept: FAMILY MEDICINE | Facility: CLINIC | Age: 42
End: 2022-05-04
Payer: COMMERCIAL

## 2022-05-04 VITALS
HEART RATE: 98 BPM | DIASTOLIC BLOOD PRESSURE: 88 MMHG | SYSTOLIC BLOOD PRESSURE: 136 MMHG | OXYGEN SATURATION: 99 % | BODY MASS INDEX: 33.01 KG/M2 | RESPIRATION RATE: 20 BRPM | TEMPERATURE: 98.5 F | WEIGHT: 210.3 LBS | HEIGHT: 67 IN

## 2022-05-04 DIAGNOSIS — R10.12 LUQ ABDOMINAL PAIN: ICD-10-CM

## 2022-05-04 DIAGNOSIS — E11.9 TYPE 2 DIABETES MELLITUS WITHOUT COMPLICATION, WITHOUT LONG-TERM CURRENT USE OF INSULIN (H): ICD-10-CM

## 2022-05-04 DIAGNOSIS — F41.1 GENERALIZED ANXIETY DISORDER: ICD-10-CM

## 2022-05-04 DIAGNOSIS — F32.1 MAJOR DEPRESSIVE DISORDER, SINGLE EPISODE, MODERATE (H): ICD-10-CM

## 2022-05-04 DIAGNOSIS — K86.2 CYST OF PANCREAS: Primary | ICD-10-CM

## 2022-05-04 LAB
ALBUMIN SERPL-MCNC: 3.9 G/DL (ref 3.5–5)
ALP SERPL-CCNC: 45 U/L (ref 45–120)
ALT SERPL W P-5'-P-CCNC: 20 U/L (ref 0–45)
ANION GAP SERPL CALCULATED.3IONS-SCNC: 9 MMOL/L (ref 5–18)
AST SERPL W P-5'-P-CCNC: 13 U/L (ref 0–40)
BILIRUB SERPL-MCNC: 0.8 MG/DL (ref 0–1)
BUN SERPL-MCNC: 11 MG/DL (ref 8–22)
CALCIUM SERPL-MCNC: 9.4 MG/DL (ref 8.5–10.5)
CHLORIDE BLD-SCNC: 105 MMOL/L (ref 98–107)
CHOLEST SERPL-MCNC: 250 MG/DL
CO2 SERPL-SCNC: 26 MMOL/L (ref 22–31)
CREAT SERPL-MCNC: 0.84 MG/DL (ref 0.6–1.1)
CREAT UR-MCNC: 266 MG/DL
FASTING STATUS PATIENT QL REPORTED: YES
GFR SERPL CREATININE-BSD FRML MDRD: 87 ML/MIN/1.73M2
GLUCOSE BLD-MCNC: 115 MG/DL (ref 70–125)
HBA1C MFR BLD: 5.3 % (ref 0–5.6)
HDLC SERPL-MCNC: 54 MG/DL
LDLC SERPL CALC-MCNC: 176 MG/DL
LIPASE SERPL-CCNC: 16 U/L (ref 0–52)
MICROALBUMIN UR-MCNC: 2.1 MG/DL (ref 0–1.99)
MICROALBUMIN/CREAT UR: 7.9 MG/G CR
POTASSIUM BLD-SCNC: 4.5 MMOL/L (ref 3.5–5)
PROT SERPL-MCNC: 6.8 G/DL (ref 6–8)
SODIUM SERPL-SCNC: 140 MMOL/L (ref 136–145)
TRIGL SERPL-MCNC: 101 MG/DL

## 2022-05-04 PROCEDURE — 99214 OFFICE O/P EST MOD 30 MIN: CPT | Performed by: FAMILY MEDICINE

## 2022-05-04 PROCEDURE — 83036 HEMOGLOBIN GLYCOSYLATED A1C: CPT | Performed by: FAMILY MEDICINE

## 2022-05-04 PROCEDURE — 80061 LIPID PANEL: CPT | Performed by: FAMILY MEDICINE

## 2022-05-04 PROCEDURE — 82043 UR ALBUMIN QUANTITATIVE: CPT | Performed by: FAMILY MEDICINE

## 2022-05-04 PROCEDURE — 83690 ASSAY OF LIPASE: CPT | Performed by: FAMILY MEDICINE

## 2022-05-04 PROCEDURE — 36415 COLL VENOUS BLD VENIPUNCTURE: CPT | Performed by: FAMILY MEDICINE

## 2022-05-04 PROCEDURE — 80053 COMPREHEN METABOLIC PANEL: CPT | Performed by: FAMILY MEDICINE

## 2022-05-04 RX ORDER — VENLAFAXINE HYDROCHLORIDE 37.5 MG/1
CAPSULE, EXTENDED RELEASE ORAL
Qty: 60 CAPSULE | Refills: 1 | Status: SHIPPED | OUTPATIENT
Start: 2022-05-04 | End: 2023-02-27

## 2022-05-04 RX ORDER — METFORMIN HCL 500 MG
500 TABLET, EXTENDED RELEASE 24 HR ORAL
Qty: 90 TABLET | Refills: 3 | Status: SHIPPED | OUTPATIENT
Start: 2022-05-04 | End: 2023-02-27

## 2022-05-04 ASSESSMENT — ANXIETY QUESTIONNAIRES
2. NOT BEING ABLE TO STOP OR CONTROL WORRYING: NEARLY EVERY DAY
7. FEELING AFRAID AS IF SOMETHING AWFUL MIGHT HAPPEN: NEARLY EVERY DAY
GAD7 TOTAL SCORE: 21
3. WORRYING TOO MUCH ABOUT DIFFERENT THINGS: NEARLY EVERY DAY
GAD7 TOTAL SCORE: 21
5. BEING SO RESTLESS THAT IT IS HARD TO SIT STILL: NEARLY EVERY DAY
6. BECOMING EASILY ANNOYED OR IRRITABLE: NEARLY EVERY DAY
1. FEELING NERVOUS, ANXIOUS, OR ON EDGE: NEARLY EVERY DAY
GAD7 TOTAL SCORE: 21
4. TROUBLE RELAXING: NEARLY EVERY DAY
7. FEELING AFRAID AS IF SOMETHING AWFUL MIGHT HAPPEN: NEARLY EVERY DAY

## 2022-05-04 ASSESSMENT — PATIENT HEALTH QUESTIONNAIRE - PHQ9
10. IF YOU CHECKED OFF ANY PROBLEMS, HOW DIFFICULT HAVE THESE PROBLEMS MADE IT FOR YOU TO DO YOUR WORK, TAKE CARE OF THINGS AT HOME, OR GET ALONG WITH OTHER PEOPLE: VERY DIFFICULT
SUM OF ALL RESPONSES TO PHQ QUESTIONS 1-9: 23
SUM OF ALL RESPONSES TO PHQ QUESTIONS 1-9: 23

## 2022-05-04 NOTE — PATIENT INSTRUCTIONS
Therapist groups in the area:    [facility] ([place] and other sites) [phone]    [facility] ([place]) [phone]    [facility] ([place]) [phone]    [facility] ([place]) [phone]

## 2022-05-05 ASSESSMENT — ANXIETY QUESTIONNAIRES: GAD7 TOTAL SCORE: 21

## 2022-05-05 ASSESSMENT — PATIENT HEALTH QUESTIONNAIRE - PHQ9: SUM OF ALL RESPONSES TO PHQ QUESTIONS 1-9: 23

## 2022-07-17 ENCOUNTER — HEALTH MAINTENANCE LETTER (OUTPATIENT)
Age: 42
End: 2022-07-17

## 2022-09-25 ENCOUNTER — HEALTH MAINTENANCE LETTER (OUTPATIENT)
Age: 42
End: 2022-09-25

## 2022-10-24 NOTE — PROGRESS NOTES
Assessment & Plan     LUQ abdominal pain  This may be positional , may be mild superficial nerve inflammation and positional.  She does have borderline splenomegaly on previous imaging and known pancreatic cyst therefore will check lipase, comprehensive metabolic panel, and CBC.  Will obtain follow-up MRI of the pancreas with early to ensure there have not been any changes contributing to her current symptoms.  Further follow-up and recommendations pending results.  - MR Pancreas wo & w Contrast; Future  - Lipase; Future  - Comprehensive metabolic panel; Future    Cyst of pancreas  We will check lipase level.  We will repeat MRI which is a little bit earlier than 6 months it was recommended but will do so early based on symptoms as above.  I feel an MRI would better address the concerns and further characterize as compared to alternative imaging including CT and ultrasound.  In addition, MRI would be indicated in less than 2 months regardless, so it would be preferable to repeat that test early then to add another imaging study.  - MR Pancreas wo & w Contrast; Future  - Lipase; Future    Generalized anxiety disorder  Major depressive disorder, single episode, moderate (H)  Inadequate control.  Patient denies suicidal ideation or intent and instead has had some passive reflection on whether she has a health condition that could be threatening her life, therefore I do not feel she needs more urgent or emergent intervention.  We will initiate venlafaxine 37.5 mg daily for 5 days and if tolerating increase to 75 mg.  Has previously not tolerated sertraline.  Information provided regarding cognitive Shay therapy which is also encouraged.  Follow-up with me in 4 weeks, notify me sooner with intolerance.  - venlafaxine (EFFEXOR-XR) 37.5 MG 24 hr capsule; Take one tab by mouth daily for 5 days, then two tabs by mouth daily.    Type 2 diabetes mellitus without complication, without long-term current use of insulin  "(H)  She is fasting today, therefore we will reassess her A1c, comprehensive metabolic panel, and fasting lipids.  Advised urine microalbumin.  Continue metformin but okay to continue just 1 tab per day.  - Albumin Random Urine Quantitative with Creat Ratio; Future  - metFORMIN (GLUCOPHAGE-XR) 500 MG 24 hr tablet; Take 1 tablet (500 mg) by mouth daily (with dinner)  - Hemoglobin A1c; Future  - Lipid panel reflex to direct LDL Fasting; Future  - Comprehensive metabolic panel; Future  - Albumin Random Urine Quantitative with Creat Ratio; Future             BMI:   Estimated body mass index is 32.94 kg/m  as calculated from the following:    Height as of this encounter: 1.702 m (5' 7\").    Weight as of this encounter: 95.4 kg (210 lb 4.8 oz).     Depression Screening Follow Up    PHQ 5/4/2022   PHQ-9 Total Score 23   Q9: Thoughts of better off dead/self-harm past 2 weeks Several days   F/U: Thoughts of suicide or self-harm No   F/U: Safety concerns No     Last PHQ-9 5/4/2022   1.  Little interest or pleasure in doing things 3   2.  Feeling down, depressed, or hopeless 3   3.  Trouble falling or staying asleep, or sleeping too much 3   4.  Feeling tired or having little energy 3   5.  Poor appetite or overeating 3   6.  Feeling bad about yourself 2   7.  Trouble concentrating 2   8.  Moving slowly or restless 3   Q9: Thoughts of better off dead/self-harm past 2 weeks 1   PHQ-9 Total Score 23   Difficulty at work, home, or with people -   In the past two weeks have you had thoughts of suicide or self harm? No   Do you have concerns about your personal safety or the safety of others? No         No flowsheet data found.      Follow Up      Follow Up Actions Taken  Crisis resource information provided in the After Visit Summary  Mental Health Referral placed verbally as patient will be exploring through her insurance coverage.  Information provided regarding cognitive behavioral therapy.  Medication initiated at visit " today.    Discussed the following ways the patient can remain in a safe environment:  be around others  See Patient Instructions    Return in about 4 weeks (around 6/1/2022) for Annual Preventive Care Visit.    Ramya Seaman MD  [facility]    Nohemi Puentes is a 42 year old who presents for the following health issues     Here today to discuss discomfort in the left lower rib cage and flank area with sudden onset about 10 days ago.  Describes it as a sensation of fullness or swelling more so than overt pain.  Has chronically had some poking type pain occurring in her abdomen.  She is also had some intermittent nausea with this.  Describes normal hunger but earlier satiety than usual.  She has a known pancreatic lesion consistent with possible IPMN, will be due for follow-up imaging via MRI in 2 months.  Noted to have borderline splenomegaly in the past as well.  Has had previous right upper quadrant ultrasound without evidence of cholecystitis or Zoya lithiasis.  She has had previous cardiovascular evaluation that was unremarkable.  She has not had any vomiting.  Remains compliant with metformin 1 tab per day and management of type 2 diabetes.    Describes increasing and ongoing struggles with anxiety and depression.  Anxiety seems to be the more prominent concern for her.  Describes frequent worry and more recently significant worry about an underlying condition in her body that is threatening her life.  She declined a work project that is due to wrap-up in June as she was concerned about whether she would even be alive due to her health conditions at that time.  She does not have any specific health condition of concern other than concern regarding her pancreas.  In the past is taken sertraline, this was discontinued due to diarrhea, did not try an alternative medication.  Is interested in seeking cognitive behavioral therapy.  She denies suicidal thoughts or intent.  She is fasting  "this morning.    History of Present Illness       Mental Health Follow-up:                    Today's PHQ-9         PHQ-9 Total Score: 23  PHQ-9 Q9 Thoughts of better off dead/self-harm past 2 weeks :   (P) Several days  Thoughts of suicide or self harm: (P) No  Self-harm Plan:     Self-harm Action:       Safety concerns for self or others: (P) No    How difficult have these problems made it for you to do your work, take care of things at home, or get along with other people: Very difficult    Today's RACHELLE-7 Score: 21    Reason for visit:  Rib pain, continuing issue, swelling under rib  Symptom onset:  1-2 weeks ago  Symptoms include:  Swollen under left rib, stomach pains, nausea  Symptom intensity:  Moderate  Symptom progression:  Staying the same  Had these symptoms before:  No  What makes it worse:  Pressure on spot  What makes it better:  Movement    She eats 4 or more servings of fruits and vegetables daily.She consumes 0 sweetened beverage(s) daily.She exercises with enough effort to increase her heart rate 30 to 60 minutes per day.  She exercises with enough effort to increase her heart rate 4 days per week.   She is taking medications regularly.             Review of Systems         Objective    /88   Pulse 98   Temp 98.5  F (36.9  C) (Oral)   Resp 20   Ht 1.702 m (5' 7\")   Wt 95.4 kg (210 lb 4.8 oz)   SpO2 99%   BMI 32.94 kg/m    Body mass index is 32.94 kg/m .  Physical Exam   Anxious and tearful female but otherwise incredibly pleasant.  Thought processes and judgment intact.  Heart with regular rate and rhythm.  Lungs clear.  Area of fullness or concern corresponds with lower rib cage in the mid axillary to slightly more anterior than that area.  She is symmetric from right side to left.  I do not appreciate any splenomegaly or other organomegaly or tenderness on exam today.                " TELE

## 2023-01-30 ENCOUNTER — HEALTH MAINTENANCE LETTER (OUTPATIENT)
Age: 43
End: 2023-01-30

## 2023-02-27 ENCOUNTER — OFFICE VISIT (OUTPATIENT)
Dept: FAMILY MEDICINE | Facility: CLINIC | Age: 43
End: 2023-02-27
Payer: COMMERCIAL

## 2023-02-27 VITALS
DIASTOLIC BLOOD PRESSURE: 86 MMHG | TEMPERATURE: 98.1 F | SYSTOLIC BLOOD PRESSURE: 138 MMHG | HEIGHT: 67 IN | WEIGHT: 217 LBS | RESPIRATION RATE: 18 BRPM | BODY MASS INDEX: 34.06 KG/M2 | HEART RATE: 84 BPM | OXYGEN SATURATION: 98 %

## 2023-02-27 DIAGNOSIS — E78.2 MIXED HYPERLIPIDEMIA: ICD-10-CM

## 2023-02-27 DIAGNOSIS — Z12.4 CERVICAL CANCER SCREENING: ICD-10-CM

## 2023-02-27 DIAGNOSIS — E11.9 TYPE 2 DIABETES MELLITUS WITHOUT COMPLICATION, WITHOUT LONG-TERM CURRENT USE OF INSULIN (H): ICD-10-CM

## 2023-02-27 DIAGNOSIS — F32.1 MAJOR DEPRESSIVE DISORDER, SINGLE EPISODE, MODERATE (H): ICD-10-CM

## 2023-02-27 DIAGNOSIS — E28.2 POLYCYSTIC OVARIES: ICD-10-CM

## 2023-02-27 DIAGNOSIS — Z00.00 ROUTINE PHYSICAL EXAMINATION: Primary | ICD-10-CM

## 2023-02-27 DIAGNOSIS — F10.10 ALCOHOL ABUSE: ICD-10-CM

## 2023-02-27 DIAGNOSIS — E06.3 HYPOTHYROIDISM DUE TO HASHIMOTO'S THYROIDITIS: ICD-10-CM

## 2023-02-27 DIAGNOSIS — F41.1 GENERALIZED ANXIETY DISORDER: ICD-10-CM

## 2023-02-27 DIAGNOSIS — K86.2 CYST OF PANCREAS: ICD-10-CM

## 2023-02-27 PROBLEM — E66.01 MORBID OBESITY (H): Status: RESOLVED | Noted: 2018-11-12 | Resolved: 2023-02-27

## 2023-02-27 LAB
ALBUMIN SERPL BCG-MCNC: 4.3 G/DL (ref 3.5–5.2)
ALP SERPL-CCNC: 45 U/L (ref 35–104)
ALT SERPL W P-5'-P-CCNC: 27 U/L (ref 10–35)
ANION GAP SERPL CALCULATED.3IONS-SCNC: 10 MMOL/L (ref 7–15)
AST SERPL W P-5'-P-CCNC: 20 U/L (ref 10–35)
BILIRUB SERPL-MCNC: 0.5 MG/DL
BUN SERPL-MCNC: 10.7 MG/DL (ref 6–20)
CALCIUM SERPL-MCNC: 9 MG/DL (ref 8.6–10)
CHLORIDE SERPL-SCNC: 105 MMOL/L (ref 98–107)
CHOLEST SERPL-MCNC: 259 MG/DL
CREAT SERPL-MCNC: 0.9 MG/DL (ref 0.51–0.95)
CREAT UR-MCNC: 210 MG/DL
DEPRECATED HCO3 PLAS-SCNC: 22 MMOL/L (ref 22–29)
GFR SERPL CREATININE-BSD FRML MDRD: 80 ML/MIN/1.73M2
GLUCOSE SERPL-MCNC: 140 MG/DL (ref 70–99)
HBA1C MFR BLD: 5.4 % (ref 0–5.6)
HDLC SERPL-MCNC: 46 MG/DL
LDLC SERPL CALC-MCNC: 185 MG/DL
MICROALBUMIN UR-MCNC: 141 MG/L
MICROALBUMIN/CREAT UR: 67.14 MG/G CR (ref 0–25)
NONHDLC SERPL-MCNC: 213 MG/DL
POTASSIUM SERPL-SCNC: 4.5 MMOL/L (ref 3.4–5.3)
PROT SERPL-MCNC: 6.9 G/DL (ref 6.4–8.3)
SODIUM SERPL-SCNC: 137 MMOL/L (ref 136–145)
TRIGL SERPL-MCNC: 141 MG/DL
TSH SERPL DL<=0.005 MIU/L-ACNC: 0.58 UIU/ML (ref 0.3–4.2)

## 2023-02-27 PROCEDURE — 83036 HEMOGLOBIN GLYCOSYLATED A1C: CPT | Performed by: FAMILY MEDICINE

## 2023-02-27 PROCEDURE — 80061 LIPID PANEL: CPT | Performed by: FAMILY MEDICINE

## 2023-02-27 PROCEDURE — 80053 COMPREHEN METABOLIC PANEL: CPT | Performed by: FAMILY MEDICINE

## 2023-02-27 PROCEDURE — 87624 HPV HI-RISK TYP POOLED RSLT: CPT | Performed by: FAMILY MEDICINE

## 2023-02-27 PROCEDURE — 84443 ASSAY THYROID STIM HORMONE: CPT | Performed by: FAMILY MEDICINE

## 2023-02-27 PROCEDURE — 82570 ASSAY OF URINE CREATININE: CPT | Performed by: FAMILY MEDICINE

## 2023-02-27 PROCEDURE — 99396 PREV VISIT EST AGE 40-64: CPT | Performed by: FAMILY MEDICINE

## 2023-02-27 PROCEDURE — 36415 COLL VENOUS BLD VENIPUNCTURE: CPT | Performed by: FAMILY MEDICINE

## 2023-02-27 PROCEDURE — G0145 SCR C/V CYTO,THINLAYER,RESCR: HCPCS | Performed by: FAMILY MEDICINE

## 2023-02-27 PROCEDURE — 99213 OFFICE O/P EST LOW 20 MIN: CPT | Mod: 25 | Performed by: FAMILY MEDICINE

## 2023-02-27 PROCEDURE — 82043 UR ALBUMIN QUANTITATIVE: CPT | Performed by: FAMILY MEDICINE

## 2023-02-27 RX ORDER — VENLAFAXINE HYDROCHLORIDE 37.5 MG/1
CAPSULE, EXTENDED RELEASE ORAL
Qty: 60 CAPSULE | Refills: 1 | Status: CANCELLED | OUTPATIENT
Start: 2023-02-27

## 2023-02-27 RX ORDER — METFORMIN HCL 500 MG
500 TABLET, EXTENDED RELEASE 24 HR ORAL
Qty: 90 TABLET | Refills: 4 | Status: SHIPPED | OUTPATIENT
Start: 2023-02-27 | End: 2024-03-05

## 2023-02-27 RX ORDER — LEVOTHYROXINE SODIUM 150 UG/1
150 TABLET ORAL DAILY
Qty: 90 TABLET | Refills: 4 | Status: SHIPPED | OUTPATIENT
Start: 2023-02-27 | End: 2024-03-05

## 2023-02-27 ASSESSMENT — ENCOUNTER SYMPTOMS
ABDOMINAL PAIN: 0
FEVER: 0
NAUSEA: 0
PALPITATIONS: 0
DYSURIA: 0
MYALGIAS: 0
HEMATOCHEZIA: 0
FREQUENCY: 0
NERVOUS/ANXIOUS: 1
CONSTIPATION: 0
HEMATURIA: 0
DIARRHEA: 0
ARTHRALGIAS: 0
PARESTHESIAS: 0
COUGH: 0
CHILLS: 0
SHORTNESS OF BREATH: 0
JOINT SWELLING: 0
EYE PAIN: 0
SORE THROAT: 0
DIZZINESS: 0
HEARTBURN: 0
WEAKNESS: 0
HEADACHES: 0

## 2023-02-27 ASSESSMENT — PATIENT HEALTH QUESTIONNAIRE - PHQ9
SUM OF ALL RESPONSES TO PHQ QUESTIONS 1-9: 12
SUM OF ALL RESPONSES TO PHQ QUESTIONS 1-9: 12
10. IF YOU CHECKED OFF ANY PROBLEMS, HOW DIFFICULT HAVE THESE PROBLEMS MADE IT FOR YOU TO DO YOUR WORK, TAKE CARE OF THINGS AT HOME, OR GET ALONG WITH OTHER PEOPLE: VERY DIFFICULT

## 2023-02-27 ASSESSMENT — PAIN SCALES - GENERAL: PAINLEVEL: NO PAIN (0)

## 2023-02-27 NOTE — PATIENT INSTRUCTIONS
You are due for Tdap and seasonal influenza vaccine, check your insurance coverage and consider receiving these.  Many insurance companies now prefer that these be administered at a pharmacy.    Once we see your cholesterol levels, I likely will recommend initiation of baby aspirin and a statin medication to support your cholesterol.  We will be in touch.    Keep your eye exam next week as scheduled with [place] [facility]    Our radiology team will contact you to schedule the MRI of your pancreas to reassess the cyst.    Preventive Health Recommendations  Female Ages 40 to 49    Yearly exam:   See your health care provider every year in order to  Review health changes.   Discuss preventive care.    Review your medicines if your doctor prescribed any.    Get a Pap test every three years (unless you have an abnormal result and your provider advises testing more often).    If you get Pap tests with HPV test, you only need to test every 5 years, unless you have an abnormal result. You do not need a Pap test if your uterus was removed (hysterectomy) and you have not had cancer.    You should be tested each year for STDs (sexually transmitted diseases), if you're at risk.   Ask your doctor if you should have a mammogram.    Have a colonoscopy (test for colon cancer) if someone in your family has had colon cancer or polyps before age 50.     Have a cholesterol test every 5 years.     Have a diabetes test (fasting glucose) after age 45. If you are at risk for diabetes, you should have this test every 3 years.    Shots: Get a flu shot each year. Get a tetanus shot every 10 years.     Nutrition:   Eat at least 5 servings of fruits and vegetables each day.  Eat whole-grain bread, whole-wheat pasta and brown rice instead of white grains and rice.  Get adequate Calcium and Vitamin D.      Lifestyle  Exercise at least 150 minutes a week (an average of 30 minutes a day, 5 days a week). This will help you control your weight and  prevent disease.  Limit alcohol to one drink per day.  No smoking.   Wear sunscreen to prevent skin cancer.  See your dentist every six months for an exam and cleaning.

## 2023-02-27 NOTE — PROGRESS NOTES
Assessment/Plan:     Routine physical examination  Encouraged healthy lifestyle habits including regular exercise, healthy eating habits, and adequate calcium and vitamin D intake.  Has been status post vasectomy for pregnancy prevention.  Up-to-date with mammogram screening and colon cancer screening.  Will await screening Pap smear results.  Discussed Tdap and influenza vaccines, she will consider.  She did not tolerate COVID-vaccine and therefore will defer.  Information provided regarding advance healthcare directives.    Type 2 diabetes mellitus without complication, without long-term current use of insulin (H)  Well-controlled, congratulated her on her efforts.  Continue metformin at current dose.  Continue to work on reduction of carbohydrate binging, continued efforts at regular exercise.  Discussed that we will have a low threshold for initiation of baby aspirin and statin pending cholesterol results.  She will keep her eye exam as scheduled next week.  Follow-up with me in 6 months.  We will check urine microalbumin and comprehensive metabolic panel today.  - Hemoglobin A1c; Future  - Albumin Random Urine Quantitative with Creat Ratio; Future  - Comprehensive metabolic panel; Future  - Hemoglobin A1c  - Albumin Random Urine Quantitative with Creat Ratio  - Comprehensive metabolic panel  - metFORMIN (GLUCOPHAGE XR) 500 MG 24 hr tablet; Take 1 tablet (500 mg) by mouth daily (with dinner)    Polycystic Ovarian Syndrome  Stable and managed with regular menses.  Continue metformin.    Mixed hyperlipidemia  We will check fasting lipids today.  Low threshold for statin initiation.  - Comprehensive metabolic panel; Future  - Lipid panel reflex to direct LDL Fasting; Future  - Comprehensive metabolic panel  - Lipid panel reflex to direct LDL Fasting    Major depressive disorder, single episode, moderate (H)  Generalized anxiety disorder  Overall stable.  She does not have active suicidal ideation but instead  passive thoughts of passing away primarily from unrecognized health conditions.  I offer my support.  This is overall stable.  She feels adequately managed.  She will remain off medication.  Follow-up in 6 months.    Depression Screening Follow Up    PHQ 2/27/2023   PHQ-9 Total Score 12   Q9: Thoughts of better off dead/self-harm past 2 weeks Several days   F/U: Thoughts of suicide or self-harm Yes   F/U: Self harm-plan No   F/U: Self-harm action No   F/U: Safety concerns No     Last PHQ-9 2/27/2023   1.  Little interest or pleasure in doing things 1   2.  Feeling down, depressed, or hopeless 1   3.  Trouble falling or staying asleep, or sleeping too much 3   4.  Feeling tired or having little energy 1   5.  Poor appetite or overeating 1   6.  Feeling bad about yourself 0   7.  Trouble concentrating 3   8.  Moving slowly or restless 1   Q9: Thoughts of better off dead/self-harm past 2 weeks 1   PHQ-9 Total Score 12   Difficulty at work, home, or with people -   In the past two weeks have you had thoughts of suicide or self harm? Yes   Do you have concerns about your personal safety or the safety of others? No   In the past 2 weeks have you thought about a plan or had intention to harm yourself? No   In the past 2 weeks have you acted on these thoughts in any way? No               Follow Up      Follow Up Actions Taken  Patient declined referral.    Alcohol abuse  Congratulated her on sobriety for over 2 and half years.    Hypothyroidism due to Hashimoto's thyroiditis  Euthyroid on levothyroxine with no recent missed doses.  We will check TSH today.  - TSH; Future  - TSH  - levothyroxine (SYNTHROID/LEVOTHROID) 150 MCG tablet; Take 1 tablet (150 mcg) by mouth daily    Cervical cancer screening  We will await Pap smear results.  - Pap Screen with HPV - recommended age 30 - 65 years    Cyst of pancreas  Order placed for follow-up MRI of pancreas, this remains asymptomatic.  - MR Pancreas wo & w Contrast;  Future    Patient has been advised of split billing requirements and indicates understanding: Yes    Patient Instructions   You are due for Tdap and seasonal influenza vaccine, check your insurance coverage and consider receiving these.  Many insurance companies now prefer that these be administered at a pharmacy.    Once we see your cholesterol levels, I likely will recommend initiation of baby aspirin and a statin medication to support your cholesterol.  We will be in touch.    Keep your eye exam next week as scheduled with [facility]    Our radiology team will contact you to schedule the MRI of your pancreas to reassess the cyst.    Preventive Health Recommendations  Female Ages 40 to 49    Yearly exam:     See your health care provider every year in order to  1. Review health changes.   2. Discuss preventive care.    3. Review your medicines if your doctor prescribed any.      Get a Pap test every three years (unless you have an abnormal result and your provider advises testing more often).      If you get Pap tests with HPV test, you only need to test every 5 years, unless you have an abnormal result. You do not need a Pap test if your uterus was removed (hysterectomy) and you have not had cancer.      You should be tested each year for STDs (sexually transmitted diseases), if you're at risk.     Ask your doctor if you should have a mammogram.      Have a colonoscopy (test for colon cancer) if someone in your family has had colon cancer or polyps before age 50.       Have a cholesterol test every 5 years.       Have a diabetes test (fasting glucose) after age 45. If you are at risk for diabetes, you should have this test every 3 years.    Shots: Get a flu shot each year. Get a tetanus shot every 10 years.     Nutrition:     Eat at least 5 servings of fruits and vegetables each day.    Eat whole-grain bread, whole-wheat pasta and brown rice instead of white grains and rice.    Get adequate Calcium and  Vitamin D.      Lifestyle    Exercise at least 150 minutes a week (an average of 30 minutes a day, 5 days a week). This will help you control your weight and prevent disease.    Limit alcohol to one drink per day.    No smoking.     Wear sunscreen to prevent skin cancer.    See your dentist every six months for an exam and cleaning.       No follow-ups on file.           Subjective:     Dhaval Austin is a 42 year old female who presents for an annual exam.     No significant changes in her health over the past year.  Continues to do well from a dietary standpoint with high vegetable primarily vegetarian and low carbohydrate diet but many dairy products.  Had been exercising less, recently resumed stationary biking and is doing well with this.  Describes chronic poor sleep, feeling as though her brain does not shut off and is often interrupted.  This has been a lifelong choi.  Depression and anxiety overall have been stable.  Much of her anxiety relates to her health.  PHQ-9 endorsing suicidal ideation though she states this is more thoughts of death related to underlying health conditions rather than active suicidal thoughts.  She declines further assistance with this.  She did not start venlafaxine after our visit in May.  Using meditation.  Finds a visual schedule has been helpful.  History of polycystic ovarian syndrome, menses are regular.   status post vasectomy for pregnancy prevention.  Remains on levothyroxine management of hypothyroidism.  Remains sober from alcohol for 2-1/2 years now.  History of 1.9 cm pancreatic cyst that is due for follow-up MRI imaging.  She did not do well with previous COVID-vaccine, declines this.  Interested in Tdap and seasonal influenza vaccine.  She has her diabetes eye exam scheduled next week at [facility].    Healthy Habits:     Getting at least 3 servings of Calcium per day:  Yes    Bi-annual eye exam:  Yes    Dental care twice a year:  Yes    Sleep  apnea or symptoms of sleep apnea:  None    Diet:  Diabetic and Vegetarian/vegan    Frequency of exercise:  4-5 days/week    Duration of exercise:  30-45 minutes    Taking medications regularly:  Yes    Medication side effects:  None    PHQ-2 Total Score: 2    Additional concerns today:  Yes      Healthy Habits:   Healthy Diet: Yes  Regular Exercise: Yes  Sunscreen Use: Yes  Dental Visits Regularly: Yes  Seat Belt: Yes  Domestic abuse:   No    Health Maintenance reviewed:  Lipid Profile: Due today  Glucose Screen: Due today  Colonoscopy: 8/4/2021, follow-up 5 years  Mammogram: 8/19/2021    Gynecologic History  Patient's last menstrual period was 02/03/2023 (exact date).  Contraception: vasectomy--current partner  Last Pap: 6/15/2017. Results were: Normal, negative        Immunization History   Administered Date(s) Administered     COVID-19 Vaccine 12+ (Pfizer) 04/09/2021     Flu, Unspecified 09/12/2016     Influenza Vaccine, 6+MO IM (QUADRIVALENT W/PRESERVATIVES) 10/29/2009     Pneumococcal 23 valent 08/13/2014     Tdap (Adacel,Boostrix) 03/22/2011     Immunization status: Eligible for Tdap, COVID #2, flu    Current Outpatient Medications   Medication Sig Dispense Refill     ACCU-CHEK COMPACT TEST Strp [ACCU-CHEK COMPACT TEST STRP] Test daily before all meals/snacks and once before bedtime. 100 strip 11     blood-glucose meter (ACCU-CHEK JOÃO) Misc [BLOOD-GLUCOSE METER (ACCU-CHEK JOÃO) MISC] Use 1 Device As Directed daily. 1 each 1     cholecalciferol, vitamin D3, 1,000 unit (25 mcg) tablet [CHOLECALCIFEROL, VITAMIN D3, 1,000 UNIT (25 MCG) TABLET] Take 1,000 Units by mouth daily.       generic lancets (ACCU-CHEK MULTICLIX LANCET) [GENERIC LANCETS (ACCU-CHEK MULTICLIX LANCET)] Test daily, varying times. 100 each 3     levothyroxine (SYNTHROID/LEVOTHROID) 150 MCG tablet Take 1 tablet (150 mcg) by mouth daily 90 tablet 4     metFORMIN (GLUCOPHAGE XR) 500 MG 24 hr tablet Take 1 tablet (500 mg) by mouth daily (with  dinner) 90 tablet 4     multivitamin therapeutic tablet [MULTIVITAMIN THERAPEUTIC TABLET] Take 1 tablet by mouth daily.       zinc gluconate 50 mg tablet [ZINC GLUCONATE 50 MG TABLET] Take 50 mg by mouth every other day.       Past Medical History:   Diagnosis Date     Anxiety      Cellulitis , 2014     Diabetes mellitus (H)     Pre- diabetic     Hirsutism      HLD (hyperlipidemia)      Hypothyroid      Obesity      Polycystic ovarian disease      Past Surgical History:   Procedure Laterality Date     ABDOMEN SURGERY      C-Sections      SECTION  ,      Patient has no known allergies.  Family History   Problem Relation Age of Onset     Liver Cancer Father      Colon Cancer Father      Breast Cancer Maternal Grandmother      Social History     Socioeconomic History     Marital status:      Spouse name: Not on file     Number of children: Not on file     Years of education: Not on file     Highest education level: Not on file   Occupational History     Not on file   Tobacco Use     Smoking status: Former     Types: Cigarettes     Quit date: 2014     Years since quittin.0     Smokeless tobacco: Never     Tobacco comments:     FORMAL SMOKER   Vaping Use     Vaping Use: Never used   Substance and Sexual Activity     Alcohol use: Not Currently     Alcohol/week: 1.0 standard drink     Comment: Alcoholic Drinks/day: sober 7 months     Drug use: No     Sexual activity: Yes     Partners: Male     Birth control/protection: None   Other Topics Concern     Not on file   Social History Narrative     Not on file     Social Determinants of Health     Financial Resource Strain: Not on file   Food Insecurity: Not on file   Transportation Needs: Not on file   Physical Activity: Not on file   Stress: Not on file   Social Connections: Not on file   Intimate Partner Violence: Not on file   Housing Stability: Not on file       Review of Systems  General:  Denies problem  Eyes: Denies  "problem  Ears/Nose/Throat: Denies problem  Cardiovascular: Denies problem  Respiratory:  Denies problem  Gastrointestinal:  Denies problem   Genitourinary: Denies problem  Musculoskeletal:  Denies problem       Objective:     /86   Pulse 84   Temp 98.1  F (36.7  C) (Oral)   Resp 18   Ht 1.702 m (5' 7\")   Wt 98.4 kg (217 lb)   LMP 02/03/2023 (Exact Date)   SpO2 98%   BMI 33.99 kg/m     Body mass index is 33.99 kg/m .     Physical Examination: General appearance - alert, well appearing, and in no distress, oriented to person, place, and time and normal appearing weight  Mental status - alert, oriented to person, place, and time, normal mood, behavior, speech, dress, motor activity, and thought processes  Eyes - pupils equal and reactive, extraocular eye movements intact  Ears - bilateral TM's and external ear canals normal  Nose - normal and patent, no erythema, discharge or polyps  Mouth - mucous membranes moist, pharynx normal without lesions  Neck - supple, no significant adenopathy  Lymphatics - no palpable lymphadenopathy, no hepatosplenomegaly  Chest - clear to auscultation, no wheezes, rales or rhonchi, symmetric air entry  Heart - normal rate, regular rhythm, normal S1, S2, no murmurs, rubs, clicks or gallops  Abdomen - soft, nontender, nondistended, no masses or organomegaly  Breasts - breasts appear normal, no suspicious masses, no skin or nipple changes or axillary nodes  Neurological - alert, oriented, normal speech, no focal findings or movement disorder noted  Musculoskeletal - no joint tenderness, deformity or swelling  Extremities - peripheral pulses normal, no pedal edema, no clubbing or cyanosis  Skin - normal coloration and turgor, no rashes, no suspicious skin lesions noted   Diabetic foot exam: normal DP and PT pulses, no trophic changes or ulcerative lesions and normal sensory exam       Office Visit on 02/27/2023   Component Date Value Ref Range Status     Hemoglobin A1C 02/27/2023 " 5.4  0.0 - 5.6 % Final    Normal <5.7%   Prediabetes 5.7-6.4%    Diabetes 6.5% or higher     Note: Adopted from ADA consensus guidelines.       Answers for HPI/ROS submitted by the patient on 2/27/2023  If you checked off any problems, how difficult have these problems made it for you to do your work, take care of things at home, or get along with other people?: Very difficult  PHQ9 TOTAL SCORE: 12  abdominal pain: No  Blood in stool: No  Blood in urine: No  chest pain: No  chills: No  congestion: No  constipation: No  cough: No  diarrhea: No  dizziness: No  ear pain: No  eye pain: No  nervous/anxious: Yes  fever: No  frequency: No  genital sores: No  headaches: No  hearing loss: No  heartburn: No  arthralgias: No  joint swelling: No  peripheral edema: No  mood changes: No  myalgias: No  nausea: No  dysuria: No  palpitations: No  Skin sensation changes: No  sore throat: No  urgency: No  rash: No  shortness of breath: No  visual disturbance: No  weakness: No

## 2023-03-02 LAB
BKR LAB AP GYN ADEQUACY: NORMAL
BKR LAB AP GYN INTERPRETATION: NORMAL
BKR LAB AP HPV REFLEX: NORMAL
BKR LAB AP PREVIOUS ABNORMAL: NORMAL
PATH REPORT.COMMENTS IMP SPEC: NORMAL
PATH REPORT.COMMENTS IMP SPEC: NORMAL
PATH REPORT.RELEVANT HX SPEC: NORMAL

## 2023-03-06 ENCOUNTER — TRANSFERRED RECORDS (OUTPATIENT)
Dept: HEALTH INFORMATION MANAGEMENT | Facility: CLINIC | Age: 43
End: 2023-03-06

## 2023-03-06 LAB
HUMAN PAPILLOMA VIRUS 16 DNA: NEGATIVE
HUMAN PAPILLOMA VIRUS 18 DNA: NEGATIVE
HUMAN PAPILLOMA VIRUS FINAL DIAGNOSIS: NORMAL
HUMAN PAPILLOMA VIRUS OTHER HR: NEGATIVE
RETINOPATHY: NEGATIVE

## 2023-03-10 DIAGNOSIS — R80.9 TYPE 2 DIABETES MELLITUS WITH MICROALBUMINURIA, WITH LONG-TERM CURRENT USE OF INSULIN (H): Primary | ICD-10-CM

## 2023-03-10 DIAGNOSIS — E78.2 MIXED HYPERLIPIDEMIA: ICD-10-CM

## 2023-03-10 DIAGNOSIS — Z79.4 TYPE 2 DIABETES MELLITUS WITH MICROALBUMINURIA, WITH LONG-TERM CURRENT USE OF INSULIN (H): Primary | ICD-10-CM

## 2023-03-10 DIAGNOSIS — E11.29 TYPE 2 DIABETES MELLITUS WITH MICROALBUMINURIA, WITH LONG-TERM CURRENT USE OF INSULIN (H): Primary | ICD-10-CM

## 2023-03-10 RX ORDER — ATORVASTATIN CALCIUM 20 MG/1
20 TABLET, FILM COATED ORAL DAILY
Qty: 90 TABLET | Refills: 4 | Status: SHIPPED | OUTPATIENT
Start: 2023-03-10 | End: 2024-03-05

## 2023-03-10 RX ORDER — LISINOPRIL 5 MG/1
5 TABLET ORAL DAILY
Qty: 90 TABLET | Refills: 4 | Status: SHIPPED | OUTPATIENT
Start: 2023-03-10 | End: 2023-05-30

## 2023-05-13 ENCOUNTER — HEALTH MAINTENANCE LETTER (OUTPATIENT)
Age: 43
End: 2023-05-13

## 2023-05-24 ENCOUNTER — MYC MEDICAL ADVICE (OUTPATIENT)
Dept: FAMILY MEDICINE | Facility: CLINIC | Age: 43
End: 2023-05-24

## 2023-05-24 DIAGNOSIS — R80.9 TYPE 2 DIABETES MELLITUS WITH MICROALBUMINURIA, WITH LONG-TERM CURRENT USE OF INSULIN (H): Primary | ICD-10-CM

## 2023-05-24 DIAGNOSIS — E11.29 TYPE 2 DIABETES MELLITUS WITH MICROALBUMINURIA, WITH LONG-TERM CURRENT USE OF INSULIN (H): Primary | ICD-10-CM

## 2023-05-24 DIAGNOSIS — Z79.4 TYPE 2 DIABETES MELLITUS WITH MICROALBUMINURIA, WITH LONG-TERM CURRENT USE OF INSULIN (H): Primary | ICD-10-CM

## 2023-05-30 RX ORDER — LOSARTAN POTASSIUM 25 MG/1
25 TABLET ORAL DAILY
Qty: 90 TABLET | Refills: 1 | Status: SHIPPED | OUTPATIENT
Start: 2023-05-30 | End: 2023-11-22

## 2023-08-24 DIAGNOSIS — E11.9 TYPE 2 DIABETES MELLITUS WITHOUT COMPLICATION, WITHOUT LONG-TERM CURRENT USE OF INSULIN (H): ICD-10-CM

## 2023-08-24 RX ORDER — METFORMIN HCL 500 MG
500 TABLET, EXTENDED RELEASE 24 HR ORAL
Qty: 90 TABLET | Refills: 4 | OUTPATIENT
Start: 2023-08-24

## 2023-08-24 NOTE — TELEPHONE ENCOUNTER
"Refused because:  Should have refills on file    Last Written Prescription Date:  2/27/23  Last Fill Quantity: 90,  # refills: 3   Last office visit provider:       Requested Prescriptions   Pending Prescriptions Disp Refills    metFORMIN (GLUCOPHAGE XR) 500 MG 24 hr tablet 90 tablet 4     Sig: Take 1 tablet (500 mg) by mouth daily (with dinner)       Biguanide Agents Passed - 8/24/2023 12:51 PM        Passed - Patient is age 10 or older        Passed - Recent (12 mo) or future (30 days) visit within the authorizing provider's specialty      Patient has had an office visit with the authorizing provider or a provider within the authorizing providers department within the previous 12 mos or has a future within next 30 days. See \"Patient Info\" tab in inbasket, or \"Choose Columns\" in Meds & Orders section of the refill encounter.              Passed - Patient does NOT have a diagnosis of CHF.        Passed - Medication is active on med list        Passed - Patient is not pregnant        Passed - Patient has not had a positive pregnancy test within the past 12 mos.              LAMBERT WOO RN 08/24/23 12:52 PM  "

## 2023-08-24 NOTE — TELEPHONE ENCOUNTER
Pending Prescriptions:                       Disp   Refills    metFORMIN (GLUCOPHAGE XR) 500 MG 24 hr ta*90 tab*4            Sig: Take 1 tablet (500 mg) by mouth daily (with           dinner)

## 2023-10-14 ENCOUNTER — HEALTH MAINTENANCE LETTER (OUTPATIENT)
Age: 43
End: 2023-10-14

## 2023-11-22 DIAGNOSIS — Z79.4 TYPE 2 DIABETES MELLITUS WITH MICROALBUMINURIA, WITH LONG-TERM CURRENT USE OF INSULIN (H): ICD-10-CM

## 2023-11-22 DIAGNOSIS — E11.29 TYPE 2 DIABETES MELLITUS WITH MICROALBUMINURIA, WITH LONG-TERM CURRENT USE OF INSULIN (H): ICD-10-CM

## 2023-11-22 DIAGNOSIS — R80.9 TYPE 2 DIABETES MELLITUS WITH MICROALBUMINURIA, WITH LONG-TERM CURRENT USE OF INSULIN (H): ICD-10-CM

## 2023-11-22 RX ORDER — LOSARTAN POTASSIUM 25 MG/1
25 TABLET ORAL DAILY
Qty: 90 TABLET | Refills: 1 | Status: SHIPPED | OUTPATIENT
Start: 2023-11-22 | End: 2024-03-05

## 2023-11-22 NOTE — TELEPHONE ENCOUNTER
Pending Prescriptions:                       Disp   Refills    losartan (COZAAR) 25 MG tablet            90 tab*1            Sig: Take 1 tablet (25 mg) by mouth daily

## 2024-01-10 ENCOUNTER — MYC MEDICAL ADVICE (OUTPATIENT)
Dept: FAMILY MEDICINE | Facility: CLINIC | Age: 44
End: 2024-01-10
Payer: COMMERCIAL

## 2024-01-10 DIAGNOSIS — Z12.11 SCREENING FOR COLON CANCER: Primary | ICD-10-CM

## 2024-01-10 DIAGNOSIS — Z12.31 ENCOUNTER FOR SCREENING MAMMOGRAM FOR MALIGNANT NEOPLASM OF BREAST: ICD-10-CM

## 2024-01-10 NOTE — TELEPHONE ENCOUNTER
Dr Seaman,  Please see FYI from patient.    Mukund Lorenz, RHODAN, RN  [facility]

## 2024-01-10 NOTE — TELEPHONE ENCOUNTER
Dr Seaman,  Please see MyChart message from patient needing provider's direction.  Please respond directly to patient if appropriate.    RHODA GongoraN, RN  [facility]

## 2024-02-19 ENCOUNTER — ANCILLARY PROCEDURE (OUTPATIENT)
Dept: MAMMOGRAPHY | Facility: CLINIC | Age: 44
End: 2024-02-19
Attending: FAMILY MEDICINE
Payer: COMMERCIAL

## 2024-02-19 DIAGNOSIS — Z12.31 ENCOUNTER FOR SCREENING MAMMOGRAM FOR MALIGNANT NEOPLASM OF BREAST: ICD-10-CM

## 2024-02-19 PROCEDURE — 77067 SCR MAMMO BI INCL CAD: CPT

## 2024-03-05 ENCOUNTER — OFFICE VISIT (OUTPATIENT)
Dept: FAMILY MEDICINE | Facility: CLINIC | Age: 44
End: 2024-03-05
Attending: FAMILY MEDICINE
Payer: COMMERCIAL

## 2024-03-05 VITALS
HEIGHT: 66 IN | WEIGHT: 207 LBS | BODY MASS INDEX: 33.27 KG/M2 | OXYGEN SATURATION: 99 % | RESPIRATION RATE: 16 BRPM | DIASTOLIC BLOOD PRESSURE: 86 MMHG | HEART RATE: 80 BPM | TEMPERATURE: 98.4 F | SYSTOLIC BLOOD PRESSURE: 138 MMHG

## 2024-03-05 DIAGNOSIS — F41.1 GENERALIZED ANXIETY DISORDER: ICD-10-CM

## 2024-03-05 DIAGNOSIS — Z79.4 TYPE 2 DIABETES MELLITUS WITH MICROALBUMINURIA, WITH LONG-TERM CURRENT USE OF INSULIN (H): ICD-10-CM

## 2024-03-05 DIAGNOSIS — E78.2 MIXED HYPERLIPIDEMIA: ICD-10-CM

## 2024-03-05 DIAGNOSIS — F32.0 MAJOR DEPRESSIVE DISORDER, SINGLE EPISODE, MILD (H): ICD-10-CM

## 2024-03-05 DIAGNOSIS — R80.9 TYPE 2 DIABETES MELLITUS WITH MICROALBUMINURIA, WITH LONG-TERM CURRENT USE OF INSULIN (H): ICD-10-CM

## 2024-03-05 DIAGNOSIS — E28.2 POLYCYSTIC OVARIES: ICD-10-CM

## 2024-03-05 DIAGNOSIS — E06.3 HYPOTHYROIDISM DUE TO HASHIMOTO'S THYROIDITIS: ICD-10-CM

## 2024-03-05 DIAGNOSIS — F10.10 ALCOHOL ABUSE: ICD-10-CM

## 2024-03-05 DIAGNOSIS — K86.2 CYST OF PANCREAS: ICD-10-CM

## 2024-03-05 DIAGNOSIS — Z00.00 ROUTINE PHYSICAL EXAMINATION: Primary | ICD-10-CM

## 2024-03-05 DIAGNOSIS — E11.29 TYPE 2 DIABETES MELLITUS WITH MICROALBUMINURIA, WITH LONG-TERM CURRENT USE OF INSULIN (H): ICD-10-CM

## 2024-03-05 LAB
ERYTHROCYTE [DISTWIDTH] IN BLOOD BY AUTOMATED COUNT: 12.4 % (ref 10–15)
HBA1C MFR BLD: 5.3 % (ref 0–5.6)
HCT VFR BLD AUTO: 36.6 % (ref 35–47)
HGB BLD-MCNC: 13.1 G/DL (ref 11.7–15.7)
HOLD SPECIMEN: NORMAL
HOLD SPECIMEN: NORMAL
MCH RBC QN AUTO: 32.2 PG (ref 26.5–33)
MCHC RBC AUTO-ENTMCNC: 35.8 G/DL (ref 31.5–36.5)
MCV RBC AUTO: 90 FL (ref 78–100)
PLATELET # BLD AUTO: 178 10E3/UL (ref 150–450)
RBC # BLD AUTO: 4.07 10E6/UL (ref 3.8–5.2)
WBC # BLD AUTO: 6 10E3/UL (ref 4–11)

## 2024-03-05 PROCEDURE — 82570 ASSAY OF URINE CREATININE: CPT | Performed by: FAMILY MEDICINE

## 2024-03-05 PROCEDURE — 80053 COMPREHEN METABOLIC PANEL: CPT | Performed by: FAMILY MEDICINE

## 2024-03-05 PROCEDURE — 82043 UR ALBUMIN QUANTITATIVE: CPT | Performed by: FAMILY MEDICINE

## 2024-03-05 PROCEDURE — 80061 LIPID PANEL: CPT | Performed by: FAMILY MEDICINE

## 2024-03-05 PROCEDURE — 99396 PREV VISIT EST AGE 40-64: CPT | Performed by: FAMILY MEDICINE

## 2024-03-05 PROCEDURE — 84439 ASSAY OF FREE THYROXINE: CPT | Performed by: FAMILY MEDICINE

## 2024-03-05 PROCEDURE — 84443 ASSAY THYROID STIM HORMONE: CPT | Performed by: FAMILY MEDICINE

## 2024-03-05 PROCEDURE — 83036 HEMOGLOBIN GLYCOSYLATED A1C: CPT | Performed by: FAMILY MEDICINE

## 2024-03-05 PROCEDURE — 36415 COLL VENOUS BLD VENIPUNCTURE: CPT | Performed by: FAMILY MEDICINE

## 2024-03-05 PROCEDURE — 85027 COMPLETE CBC AUTOMATED: CPT | Performed by: FAMILY MEDICINE

## 2024-03-05 PROCEDURE — 99214 OFFICE O/P EST MOD 30 MIN: CPT | Mod: 25 | Performed by: FAMILY MEDICINE

## 2024-03-05 RX ORDER — LOSARTAN POTASSIUM 25 MG/1
25 TABLET ORAL DAILY
Qty: 90 TABLET | Refills: 4 | Status: SHIPPED | OUTPATIENT
Start: 2024-03-05

## 2024-03-05 RX ORDER — METFORMIN HCL 500 MG
500 TABLET, EXTENDED RELEASE 24 HR ORAL
Qty: 90 TABLET | Refills: 4 | Status: SHIPPED | OUTPATIENT
Start: 2024-03-05

## 2024-03-05 RX ORDER — LEVOTHYROXINE SODIUM 150 UG/1
150 TABLET ORAL DAILY
Qty: 90 TABLET | Refills: 4 | Status: SHIPPED | OUTPATIENT
Start: 2024-03-05 | End: 2024-03-07

## 2024-03-05 RX ORDER — ATORVASTATIN CALCIUM 20 MG/1
20 TABLET, FILM COATED ORAL DAILY
Qty: 90 TABLET | Refills: 4 | Status: SHIPPED | OUTPATIENT
Start: 2024-03-05 | End: 2024-10-03

## 2024-03-05 SDOH — HEALTH STABILITY: PHYSICAL HEALTH: ON AVERAGE, HOW MANY DAYS PER WEEK DO YOU ENGAGE IN MODERATE TO STRENUOUS EXERCISE (LIKE A BRISK WALK)?: 7 DAYS

## 2024-03-05 ASSESSMENT — SOCIAL DETERMINANTS OF HEALTH (SDOH): HOW OFTEN DO YOU GET TOGETHER WITH FRIENDS OR RELATIVES?: ONCE A WEEK

## 2024-03-05 ASSESSMENT — PAIN SCALES - GENERAL: PAINLEVEL: NO PAIN (0)

## 2024-03-05 ASSESSMENT — PATIENT HEALTH QUESTIONNAIRE - PHQ9
10. IF YOU CHECKED OFF ANY PROBLEMS, HOW DIFFICULT HAVE THESE PROBLEMS MADE IT FOR YOU TO DO YOUR WORK, TAKE CARE OF THINGS AT HOME, OR GET ALONG WITH OTHER PEOPLE: VERY DIFFICULT
SUM OF ALL RESPONSES TO PHQ QUESTIONS 1-9: 7
SUM OF ALL RESPONSES TO PHQ QUESTIONS 1-9: 7

## 2024-03-05 NOTE — PROGRESS NOTES
Preventive Care Visit  [facility]  Ramya Seaman MD, Family Medicine  Mar 5, 2024    Assessment & Plan     Routine physical examination  Encouraged healthy lifestyle habits including regular exercise, healthy eating habits, and adequate calcium and vitamin D intake.  Discussed COVID-vaccine, flu vaccine, Tdap, hepatitis B series.  She declines today.  Will check fasting lipids today.  Up-to-date with Pap, mammo, and colon cancer screening.  - Extra Tube; Standing  - Extra Tube    Type 2 diabetes mellitus with microalbuminuria, with long-term current use of insulin (H)  Encouraged efforts at healthy lifestyle habits.  Okay to stop metformin and see how she does off metformin altogether.  If electing to do so, advised that she return to clinic for lab only visit to check A1c in 3 months.  Will check kidney and liver function, urine microalbumin, fasting lipids.  - Hemoglobin A1c; Standing  - Albumin Random Urine Quantitative with Creat Ratio; Standing  - Hemoglobin A1c  - Albumin Random Urine Quantitative with Creat Ratio  - Lipid panel reflex to direct LDL Fasting; Future  - atorvastatin (LIPITOR) 20 MG tablet; Take 1 tablet (20 mg) by mouth daily  - losartan (COZAAR) 25 MG tablet; Take 1 tablet (25 mg) by mouth daily  - metFORMIN (GLUCOPHAGE XR) 500 MG 24 hr tablet; Take 1 tablet (500 mg) by mouth daily (with dinner)  - Comprehensive metabolic panel; Future    Polycystic Ovarian Syndrome  Stable and controlled.    Mixed hyperlipidemia  Continue atorvastatin.  Encouraged healthy lifestyle habits.  Will check fasting lipids and liver function.  - atorvastatin (LIPITOR) 20 MG tablet; Take 1 tablet (20 mg) by mouth daily  - Comprehensive metabolic panel; Future    Hypothyroidism due to Hashimoto's thyroiditis  Stable and controlled on levothyroxine.  Will check TSH.  - TSH WITH FREE T4 REFLEX; Future  - levothyroxine (SYNTHROID/LEVOTHROID) 150 MCG tablet; Take 1 tablet (150 mcg) by mouth  "daily    Cyst of pancreas  Advised follow-up MRI.  Order placed.  - MR Pancreas wo & w Contrast; Future    Generalized Anxiety Disorder  Major depressive disorder, single episode, mild (H)  Stable and controlled without need for medication.    Alcohol abuse  Congratulated her on continued sobriety for almost 4 years.  Will check liver function and CBC today.  - Comprehensive metabolic panel; Future  - CBC with platelets; Future              BMI  Estimated body mass index is 33.67 kg/m  as calculated from the following:    Height as of this encounter: 1.67 m (5' 5.75\").    Weight as of this encounter: 93.9 kg (207 lb).       Counseling  Appropriate preventive services were discussed with this patient, including applicable screening as appropriate for fall prevention, nutrition, physical activity, Tobacco-use cessation, weight loss and cognition.  Checklist reviewing preventive services available has been given to the patient.  Reviewed patient's diet, addressing concerns and/or questions.   The patient's PHQ-9 score is consistent with mild depression. She was provided with information regarding depression.           Nohemi Puentes is a 43 year old, presenting for the following:  Physical (Fasting since 7am/)         Via the Health Maintenance questionnaire, the patient has reported the following services have been completed -Eye Exam, this information has been sent to the abstraction team.  Health Care Directive  Patient does not have a Health Care Directive or Living Will: Discussed advance care planning with patient; information given to patient to review.    HPI  Here for routine preventive care visit.  She has no specific concerns today.  Decided to take her metformin just every other day, interested in attempting to wean off this.  Blood sugars have been stable.  Home blood pressures have been less than 130 systolic, less than 85 diastolic and she is feeling well without need for medication.  Taking " losartan for microalbuminuria.  History of depression and anxiety, overall is been doing very well.  Some struggles with anxiety but feels she is managing well with her own techniques.  Menses regular, has rarely had some hot flashes here and there but nothing ongoing, no vaginal dryness.  Compliant with levothyroxine and management of hypothyroidism, due for follow-up.  Remains on atorvastatin and management of dyslipidemia.  Exercising by walking within her home, running in walking on the treadmill, and biking.  Feeling well with this.  She is fasting today.  She is will be scheduling her eye exam soon, it was just over a year ago.            3/5/2024   General Health   How would you rate your overall physical health? Good   Feel stress (tense, anxious, or unable to sleep) Very much   (!) STRESS CONCERN      3/5/2024   Nutrition   Three or more servings of calcium each day? Yes   Diet: Low salt    Diabetic    Carbohydrate counting    Vegetarian/vegan   How many servings of fruit and vegetables per day? 4 or more   How many sweetened beverages each day? 0-1         3/5/2024   Exercise   Days per week of moderate/strenous exercise 7 days         3/5/2024   Social Factors   Frequency of gathering with friends or relatives Once a week   Worry food won't last until get money to buy more No   Food not last or not have enough money for food? No   Do you have housing?  Yes   Are you worried about losing your housing? No   Lack of transportation? No   Unable to get utilities (heat,electricity)? No         3/5/2024   Dental   Dentist two times every year? Yes         3/5/2024   TB Screening   Were you born outside of [place]?  No       Today's PHQ-9 Score:       3/5/2024     2:58 PM   PHQ-9 SCORE   PHQ-9 Total Score MyChart 7 (Mild depression)   PHQ-9 Total Score 7         3/5/2024   Substance Use   Alcohol more than 3/day or more than 7/wk Not Applicable   Do you use any other substances recreationally? No     Social History      Tobacco Use    Smoking status: Former     Types: Cigarettes     Quit date: 2/20/2014     Years since quitting: 10.0    Smokeless tobacco: Never    Tobacco comments:     FORMAL SMOKER   Vaping Use    Vaping Use: Never used   Substance Use Topics    Alcohol use: Not Currently     Alcohol/week: 1.0 standard drink of alcohol     Comment: Alcoholic Drinks/day: sober 7 months    Drug use: No           2/19/2024   LAST FHS-7 RESULTS   1st degree relative breast or ovarian cancer No   Any relative bilateral breast cancer No   Any male have breast cancer No   Any ONE woman have BOTH breast AND ovarian cancer No   Any woman with breast cancer before 50yrs No   2 or more relatives with breast AND/OR ovarian cancer No   2 or more relatives with breast AND/OR bowel cancer Yes        Mammogram Screening - Mammogram every 1-2 years updated in Health Maintenance based on mutual decision making        3/5/2024   STI Screening   New sexual partner(s) since last STI/HIV test? No     History of abnormal Pap smear: NO - age 30-65 PAP every 5 years with negative HPV co-testing recommended        Latest Ref Rng & Units 2/27/2023     9:56 AM 7/3/2017     8:04 AM   PAP / HPV   PAP  Negative for Intraepithelial Lesion or Malignancy (NILM)  Negative for squamous intraepithelial lesion or malignancy  Electronically signed by Velma Patton CT (ASCP) on 7/14/2017 at  2:24 PM      HPV 16 DNA Negative Negative     HPV 18 DNA Negative Negative     Other HR HPV Negative Negative       ASCVD Risk   The ASCVD Risk score (Zoe CLAUDIO, et al., 2019) failed to calculate for the following reasons:    The systolic blood pressure is missing        3/5/2024   Contraception/Family Planning   Questions about contraception or family planning No        Reviewed and updated as needed this visit by Provider                    Past Medical History:   Diagnosis Date    Anxiety     Cellulitis 2013, 6/2014    Diabetes mellitus (H) 2014    Pre- diabetic     Hirsutism     HLD (hyperlipidemia)     Hypothyroid     Obesity     Polycystic ovarian disease      Past Surgical History:   Procedure Laterality Date    ABDOMEN SURGERY      C-Sections     SECTION  ,      OB History   No obstetric history on file.     Lab work is in process  Labs reviewed in EPIC  BP Readings from Last 3 Encounters:   24 138/86   23 138/86   22 136/88    Wt Readings from Last 3 Encounters:   24 93.9 kg (207 lb)   23 98.4 kg (217 lb)   22 95.4 kg (210 lb 4.8 oz)                  Patient Active Problem List   Diagnosis    Hypothyroidism    Hyperlipidemia    Generalized Anxiety Disorder    Polycystic Ovarian Syndrome    Type 2 diabetes mellitus with microalbuminuria, with long-term current use of insulin (H)    Alcohol abuse    Major depressive disorder, single episode, moderate (H)    Cyst of pancreas     Past Surgical History:   Procedure Laterality Date    ABDOMEN SURGERY      C-Sections     SECTION  ,        Social History     Tobacco Use    Smoking status: Former     Types: Cigarettes     Quit date: 2014     Years since quitting: 10.0    Smokeless tobacco: Never    Tobacco comments:     FORMAL SMOKER   Substance Use Topics    Alcohol use: Not Currently     Alcohol/week: 1.0 standard drink of alcohol     Comment: Alcoholic Drinks/day: sober 7 months     Family History   Problem Relation Age of Onset    Liver Cancer Father     Colon Cancer Father     Breast Cancer Maternal Grandmother          Current Outpatient Medications   Medication Sig Dispense Refill    ACCU-CHEK COMPACT TEST Strp [ACCU-CHEK COMPACT TEST STRP] Test daily before all meals/snacks and once before bedtime. 100 strip 11    atorvastatin (LIPITOR) 20 MG tablet Take 1 tablet (20 mg) by mouth daily 90 tablet 4    blood-glucose meter (ACCU-CHEK JOÃO) Misc [BLOOD-GLUCOSE METER (ACCU-CHEK JOÃO) MISC] Use 1 Device As Directed daily. 1 each 1    cholecalciferol,  "vitamin D3, 1,000 unit (25 mcg) tablet [CHOLECALCIFEROL, VITAMIN D3, 1,000 UNIT (25 MCG) TABLET] Take 1,000 Units by mouth daily.      generic lancets (ACCU-CHEK MULTICLIX LANCET) [GENERIC LANCETS (ACCU-CHEK MULTICLIX LANCET)] Test daily, varying times. 100 each 3    levothyroxine (SYNTHROID/LEVOTHROID) 150 MCG tablet Take 1 tablet (150 mcg) by mouth daily 90 tablet 4    losartan (COZAAR) 25 MG tablet Take 1 tablet (25 mg) by mouth daily 90 tablet 4    metFORMIN (GLUCOPHAGE XR) 500 MG 24 hr tablet Take 1 tablet (500 mg) by mouth daily (with dinner) 90 tablet 4    multivitamin therapeutic tablet [MULTIVITAMIN THERAPEUTIC TABLET] Take 1 tablet by mouth daily.      zinc gluconate 50 mg tablet [ZINC GLUCONATE 50 MG TABLET] Take 50 mg by mouth every other day.       No Known Allergies  Recent Labs   Lab Test 03/05/24  1458 02/27/23  0905 05/04/22  0951 01/19/22  0658 12/15/21  1645 05/17/21  0900 05/17/21  0819 10/20/20  0735   A1C 5.3 5.4 5.3  --  5.4  --    < > 5.8*   LDL  --  185* 176*  --   --  85  --  72   HDL  --  46* 54  --   --  56  --  47*   TRIG  --  141 101  --   --  80  --  94   ALT  --  27 20  --   --  25  --  36   CR  --  0.90 0.84   < >  --  0.87  --  0.83   GFRESTIMATED  --  80 87   < >  --  >60  --  >60   GFRESTBLACK  --   --   --   --   --  >60  --  >60   POTASSIUM  --  4.5 4.5  --   --  4.4  --  4.6   TSH  --  0.58  --   --  3.50 6.15*  --  0.90    < > = values in this interval not displayed.          Review of Systems  Constitutional, neuro, ENT, endocrine, pulmonary, cardiac, gastrointestinal, genitourinary, musculoskeletal, integument and psychiatric systems are negative, except as otherwise noted.     Objective    Exam  LMP 02/28/2024 (Approximate)    Estimated body mass index is 33.99 kg/m  as calculated from the following:    Height as of 2/27/23: 1.702 m (5' 7\").    Weight as of 2/27/23: 98.4 kg (217 lb).    Physical Exam  Physical Examination: General appearance - alert, well appearing, and in " no distress, oriented to person, place, and time and normal appearing weight  Mental status - alert, oriented to person, place, and time, normal mood, behavior, speech, dress, motor activity, and thought processes  Eyes - pupils equal and reactive, extraocular eye movements intact  Ears - bilateral TM's and external ear canals normal  Nose - normal and patent, no erythema, discharge or polyps  Mouth - mucous membranes moist, pharynx normal without lesions  Neck - supple, no significant adenopathy  Lymphatics - no palpable lymphadenopathy, no hepatosplenomegaly  Chest - clear to auscultation, no wheezes, rales or rhonchi, symmetric air entry  Heart - normal rate, regular rhythm, normal S1, S2, no murmurs, rubs, clicks or gallops  Abdomen - soft, nontender, nondistended, no masses or organomegaly  Breasts - breasts appear normal, no suspicious masses, no skin or nipple changes or axillary nodes  Neurological - alert, oriented, normal speech, no focal findings or movement disorder noted  Musculoskeletal - no joint tenderness, deformity or swelling  Extremities - peripheral pulses normal, no pedal edema, no clubbing or cyanosis  Skin - normal coloration and turgor, no rashes, no suspicious skin lesions noted      Office Visit on 03/05/2024   Component Date Value Ref Range Status    Hemoglobin A1C 03/05/2024 5.3  0.0 - 5.6 % Final    Normal <5.7%   Prediabetes 5.7-6.4%    Diabetes 6.5% or higher     Note: Adopted from ADA consensus guidelines.    Hold Specimen 03/05/2024 [facility]   Final    Hold Specimen 03/05/2024 [facility]   Final          Signed Electronically by: Ramya Seaman MD    Answers submitted by the patient for this visit:  Patient Health Questionnaire (Submitted on 3/5/2024)  If you checked off any problems, how difficult have these problems made it for you to do your work, take care of things at home, or get along with other people?: Very difficult  PHQ9 TOTAL SCORE: 7

## 2024-03-05 NOTE — PATIENT INSTRUCTIONS
Preventive Care Advice   This is general advice given by our system to help you stay healthy. However, your care team may have specific advice just for you. Please talk to your care team about your preventive care needs.  Nutrition  Eat 5 or more servings of fruits and vegetables each day.  Try wheat bread, brown rice and whole grain pasta (instead of white bread, rice, and pasta).  Get enough calcium and vitamin D. Check the label on foods and aim for 100% of the RDA (recommended daily allowance).  Lifestyle  Exercise at least 150 minutes each week   (30 minutes a day, 5 days a week).  Do muscle strengthening activities 2 days a week. These help control your weight and prevent disease.  No smoking.  Wear sunscreen to prevent skin cancer.  Have a dental exam and cleaning every 6 months.  Yearly exams  See your health care team every year to talk about:  Any changes in your health.  Any medicines your care team has prescribed.  Preventive care, family planning, and ways to prevent chronic diseases.  Shots (vaccines)   HPV shots (up to age 26), if you've never had them before.  Hepatitis B shots (up to age 59), if you've never had them before.  COVID-19 shot: Get this shot when it's due.  Flu shot: Get a flu shot every year.  Tetanus shot: Get a tetanus shot every 10 years.  Pneumococcal, hepatitis A, and RSV shots: Ask your care team if you need these based on your risk.  Shingles shot (for age 50 and up).  General health tests  Diabetes screening:  Starting at age 35, Get screened for diabetes at least every 3 years.  If you are younger than age 35, ask your care team if you should be screened for diabetes.  Cholesterol test: At age 39, start having a cholesterol test every 5 years, or more often if advised.  Bone density scan (DEXA): At age 50, ask your care team if you should have this scan for osteoporosis (brittle bones).  Hepatitis C: Get tested at least once in your life.  STIs (sexually transmitted  infections)  Before age 24: Ask your care team if you should be screened for STIs.  After age 24: Get screened for STIs if you're at risk. You are at risk for STIs (including HIV) if:  You are sexually active with more than one person.  You don't use condoms every time.  You or a partner was diagnosed with a sexually transmitted infection.  If you are at risk for HIV, ask about PrEP medicine to prevent HIV.  Get tested for HIV at least once in your life, whether you are at risk for HIV or not.  Cancer screening tests  Cervical cancer screening: If you have a cervix, begin getting regular cervical cancer screening tests at age 21. Most people who have regular screenings with normal results can stop after age 65. Talk about this with your provider.  Breast cancer scan (mammogram): If you've ever had breasts, begin having regular mammograms starting at age 40. This is a scan to check for breast cancer.  Colon cancer screening: It is important to start screening for colon cancer at age 45.  Have a colonoscopy test every 10 years (or more often if you're at risk) Or, ask your provider about stool tests like a FIT test every year or Cologuard test every 3 years.  To learn more about your testing options, visit: https://[email or web address]/664076.pdf.  For help making a decision, visit: https://bit.ly/lb23131.  Prostate cancer screening test: If you have a prostate and are age 55 to 69, ask your provider if you would benefit from a yearly prostate cancer screening test.  Lung cancer screening: If you are a current or former smoker age 50 to 80, ask your care team if ongoing lung cancer screenings are right for you.  For informational purposes only. Not to replace the advice of your health care provider. Copyright   2023 [employer]. All rights reserved. Clinically reviewed by the [facility] Transitions Program. [employer] 240251 - REV 01/24.    Learning About Stress  What is stress?     Stress is your  body's response to a hard situation. Your body can have a physical, emotional, or mental response. Stress is a fact of life for most people, and it affects everyone differently. What causes stress for you may not be stressful for someone else.  A lot of things can cause stress. You may feel stress when you go on a job interview, take a test, or run a race. This kind of short-term stress is normal and even useful. It can help you if you need to work hard or react quickly. For example, stress can help you finish an important job on time.  Long-term stress is caused by ongoing stressful situations or events. Examples of long-term stress include long-term health problems, ongoing problems at work, or conflicts in your family. Long-term stress can harm your health.  How does stress affect your health?  When you are stressed, your body responds as though you are in danger. It makes hormones that speed up your heart, make you breathe faster, and give you a burst of energy. This is called the fight-or-flight stress response. If the stress is over quickly, your body goes back to normal and no harm is done.  But if stress happens too often or lasts too long, it can have bad effects. Long-term stress can make you more likely to get sick, and it can make symptoms of some diseases worse. If you tense up when you are stressed, you may develop neck, shoulder, or low back pain. Stress is linked to high blood pressure and heart disease.  Stress also harms your emotional health. It can make you butler, tense, or depressed. Your relationships may suffer, and you may not do well at work or school.  What can you do to manage stress?  You can try these things to help manage stress:   Do something active. Exercise or activity can help reduce stress. Walking is a great way to get started. Even everyday activities such as housecleaning or yard work can help.  Try yoga or renetta chi. These techniques combine exercise and meditation. You may need  some training at first to learn them.  Do something you enjoy. For example, listen to music or go to a movie. Practice your hobby or do volunteer work.  Meditate. This can help you relax, because you are not worrying about what happened before or what may happen in the future.  Do guided imagery. Imagine yourself in any setting that helps you feel calm. You can use online videos, books, or a teacher to guide you.  Do breathing exercises. For example:  From a standing position, bend forward from the waist with your knees slightly bent. Let your arms dangle close to the floor.  Breathe in slowly and deeply as you return to a standing position. Roll up slowly and lift your head last.  Hold your breath for just a few seconds in the standing position.  Breathe out slowly and bend forward from the waist.  Let your feelings out. Talk, laugh, cry, and express anger when you need to. Talking with supportive friends or family, a counselor, or a geoffrey leader about your feelings is a healthy way to relieve stress. Avoid discussing your feelings with people who make you feel worse.  Write. It may help to write about things that are bothering you. This helps you find out how much stress you feel and what is causing it. When you know this, you can find better ways to cope.  What can you do to prevent stress?  You might try some of these things to help prevent stress:  Manage your time. This helps you find time to do the things you want and need to do.  Get enough sleep. Your body recovers from the stresses of the day while you are sleeping.  Get support. Your family, friends, and community can make a difference in how you experience stress.  Limit your news feed. Avoid or limit time on social media or news that may make you feel stressed.  Do something active. Exercise or activity can help reduce stress. Walking is a great way to get started.  Where can you learn more?  Go to https://www.healthwise.net/patiented  Enter N032 in the  "search box to learn more about \"Learning About Stress.\"  Current as of: February 26, 2023               Content Version: 13.8    2824-9186 [employer].   Care instructions adapted under license by your healthcare professional. If you have questions about a medical condition or this instruction, always ask your healthcare professional. [employer] disclaims any warranty or liability for your use of this information.      Learning About Depression Screening  What is depression screening?  Depression screening is a way to see if you have depression symptoms. It may be done by a doctor or counselor. It's often part of a routine checkup. That's because your mental health is just as important as your physical health.  Depression is a mental health condition that affects how you feel, think, and act. You may:  Have less energy.  Lose interest in your daily activities.  Feel sad and grouchy for a long time.  Depression is very common. It affects people of all ages.  Many things can lead to depression. Some people become depressed after they have a stroke or find out they have a major illness like cancer or heart disease. The death of a loved one or a breakup may lead to depression. It can run in families. Most experts believe that a combination of inherited genes and stressful life events can cause it.  What happens during screening?  You may be asked to fill out a form about your depression symptoms. You and the doctor will discuss your answers. The doctor may ask you more questions to learn more about how you think, act, and feel.  What happens after screening?  If you have symptoms of depression, your doctor will talk to you about your options.  Doctors usually treat depression with medicines or counseling. Often, combining the two works best. Many people don't get help because they think that they'll get over the depression on their own. But people with depression may not get better unless they " "get treatment.  The cause of depression is not well understood. There may be many factors involved. But if you have depression, it's not your fault.  A serious symptom of depression is thinking about death or suicide. If you or someone you care about talks about this or about feeling hopeless, get help right away.  It's important to know that depression can be treated. Medicine, counseling, and self-care may help.  Where can you learn more?  Go to https://www.PubCoder.net/patiented  Enter T185 in the search box to learn more about \"Learning About Depression Screening.\"  Current as of: June 25, 2023               Content Version: 13.8    5413-6145 [employer].   Care instructions adapted under license by your healthcare professional. If you have questions about a medical condition or this instruction, always ask your healthcare professional. [employer] disclaims any warranty or liability for your use of this information.      "

## 2024-03-06 LAB
ALBUMIN SERPL BCG-MCNC: 4.5 G/DL (ref 3.5–5.2)
ALP SERPL-CCNC: 47 U/L (ref 40–150)
ALT SERPL W P-5'-P-CCNC: 34 U/L (ref 0–50)
ANION GAP SERPL CALCULATED.3IONS-SCNC: 13 MMOL/L (ref 7–15)
AST SERPL W P-5'-P-CCNC: 18 U/L (ref 0–45)
BILIRUB SERPL-MCNC: 0.5 MG/DL
BUN SERPL-MCNC: 12 MG/DL (ref 6–20)
CALCIUM SERPL-MCNC: 9.3 MG/DL (ref 8.6–10)
CHLORIDE SERPL-SCNC: 101 MMOL/L (ref 98–107)
CHOLEST SERPL-MCNC: 237 MG/DL
CREAT SERPL-MCNC: 0.98 MG/DL (ref 0.51–0.95)
CREAT UR-MCNC: 54.2 MG/DL
DEPRECATED HCO3 PLAS-SCNC: 24 MMOL/L (ref 22–29)
EGFRCR SERPLBLD CKD-EPI 2021: 72 ML/MIN/1.73M2
FASTING STATUS PATIENT QL REPORTED: ABNORMAL
GLUCOSE SERPL-MCNC: 100 MG/DL (ref 70–99)
HDLC SERPL-MCNC: 50 MG/DL
LDLC SERPL CALC-MCNC: 172 MG/DL
MICROALBUMIN UR-MCNC: <12 MG/L
MICROALBUMIN/CREAT UR: NORMAL MG/G{CREAT}
NONHDLC SERPL-MCNC: 187 MG/DL
POTASSIUM SERPL-SCNC: 4.1 MMOL/L (ref 3.4–5.3)
PROT SERPL-MCNC: 6.9 G/DL (ref 6.4–8.3)
SODIUM SERPL-SCNC: 138 MMOL/L (ref 135–145)
T4 FREE SERPL-MCNC: 1.93 NG/DL (ref 0.9–1.7)
TRIGL SERPL-MCNC: 74 MG/DL
TSH SERPL DL<=0.005 MIU/L-ACNC: 0.17 UIU/ML (ref 0.3–4.2)

## 2024-03-07 DIAGNOSIS — E06.3 HYPOTHYROIDISM DUE TO HASHIMOTO'S THYROIDITIS: ICD-10-CM

## 2024-03-07 RX ORDER — LEVOTHYROXINE SODIUM 137 UG/1
137 TABLET ORAL DAILY
Qty: 90 TABLET | Refills: 1 | Status: SHIPPED | OUTPATIENT
Start: 2024-03-07 | End: 2024-10-03

## 2024-03-07 NOTE — RESULT ENCOUNTER NOTE
"Your thyroid is mildly overtreated.  Reduce your levothyroxine dose to 137 mcg daily.  Return to clinic in 4 to 6 weeks for lab only visit to recheck your thyroid.    Your LDL or \"bad cholesterol\" is rather high.  Have you been taking your atorvastatin?  If so, continue this.  If not, we should increase the dose to 40 mg daily at bedtime.  Continue work on healthy eating and regular exercise to prevent cholesterol and reduce your risk of heart disease in the future."

## 2024-04-11 ENCOUNTER — HOSPITAL ENCOUNTER (OUTPATIENT)
Dept: MRI IMAGING | Facility: HOSPITAL | Age: 44
Discharge: HOME | End: 2024-04-11
Attending: FAMILY MEDICINE | Admitting: FAMILY MEDICINE
Payer: COMMERCIAL

## 2024-04-11 DIAGNOSIS — K86.2 CYST OF PANCREAS: ICD-10-CM

## 2024-04-11 PROCEDURE — 255N000002 HC RX 255 OP 636: Performed by: FAMILY MEDICINE

## 2024-04-11 PROCEDURE — A9585 GADOBUTROL INJECTION: HCPCS | Performed by: FAMILY MEDICINE

## 2024-04-11 PROCEDURE — 74183 MRI ABD W/O CNTR FLWD CNTR: CPT

## 2024-04-11 RX ORDER — GADOBUTROL 604.72 MG/ML
0.1 INJECTION INTRAVENOUS ONCE
Status: COMPLETED | OUTPATIENT
Start: 2024-04-11 | End: 2024-04-11

## 2024-04-11 RX ADMIN — GADOBUTROL 9 ML: 604.72 INJECTION INTRAVENOUS at 18:35

## 2024-04-12 ENCOUNTER — MYC MEDICAL ADVICE (OUTPATIENT)
Dept: FAMILY MEDICINE | Facility: CLINIC | Age: 44
End: 2024-04-12
Payer: COMMERCIAL

## 2024-04-12 NOTE — RESULT ENCOUNTER NOTE
"Your MRI shows \"minimal\" increase in size (by only 3mm) since your last MRI, which is great news it.  There are no concerning findings.  In this setting, it is important that we continue to check this every year with an MRI to detect any changes early.  At this time there is nothing suggestive of cancer, but we will continue to monitor with yearly MRI."

## 2024-06-27 ENCOUNTER — LAB (OUTPATIENT)
Dept: LAB | Facility: CLINIC | Age: 44
End: 2024-06-27
Payer: COMMERCIAL

## 2024-06-27 DIAGNOSIS — E06.3 HYPOTHYROIDISM DUE TO HASHIMOTO'S THYROIDITIS: ICD-10-CM

## 2024-06-27 DIAGNOSIS — Z00.00 ROUTINE PHYSICAL EXAMINATION: ICD-10-CM

## 2024-06-27 DIAGNOSIS — Z79.4 TYPE 2 DIABETES MELLITUS WITH MICROALBUMINURIA, WITH LONG-TERM CURRENT USE OF INSULIN (H): ICD-10-CM

## 2024-06-27 DIAGNOSIS — E78.2 MIXED HYPERLIPIDEMIA: ICD-10-CM

## 2024-06-27 DIAGNOSIS — R80.9 TYPE 2 DIABETES MELLITUS WITH MICROALBUMINURIA, WITH LONG-TERM CURRENT USE OF INSULIN (H): ICD-10-CM

## 2024-06-27 DIAGNOSIS — E11.29 TYPE 2 DIABETES MELLITUS WITH MICROALBUMINURIA, WITH LONG-TERM CURRENT USE OF INSULIN (H): ICD-10-CM

## 2024-06-27 LAB
CREAT UR-MCNC: 116 MG/DL
HBA1C MFR BLD: 5.2 % (ref 0–5.6)
HOLD SPECIMEN: NORMAL
HOLD SPECIMEN: NORMAL
MICROALBUMIN UR-MCNC: <12 MG/L
MICROALBUMIN/CREAT UR: NORMAL MG/G{CREAT}
TSH SERPL DL<=0.005 MIU/L-ACNC: 1.73 UIU/ML (ref 0.3–4.2)

## 2024-06-27 PROCEDURE — 80061 LIPID PANEL: CPT

## 2024-06-27 PROCEDURE — 83036 HEMOGLOBIN GLYCOSYLATED A1C: CPT

## 2024-06-27 PROCEDURE — 84443 ASSAY THYROID STIM HORMONE: CPT

## 2024-06-27 PROCEDURE — 82570 ASSAY OF URINE CREATININE: CPT

## 2024-06-27 PROCEDURE — 36415 COLL VENOUS BLD VENIPUNCTURE: CPT

## 2024-06-27 PROCEDURE — 82043 UR ALBUMIN QUANTITATIVE: CPT

## 2024-06-28 ENCOUNTER — MYC MEDICAL ADVICE (OUTPATIENT)
Dept: FAMILY MEDICINE | Facility: CLINIC | Age: 44
End: 2024-06-28
Payer: COMMERCIAL

## 2024-06-28 DIAGNOSIS — R80.9 TYPE 2 DIABETES MELLITUS WITH MICROALBUMINURIA, WITH LONG-TERM CURRENT USE OF INSULIN (H): Primary | ICD-10-CM

## 2024-06-28 DIAGNOSIS — E11.29 TYPE 2 DIABETES MELLITUS WITH MICROALBUMINURIA, WITH LONG-TERM CURRENT USE OF INSULIN (H): Primary | ICD-10-CM

## 2024-06-28 DIAGNOSIS — E78.2 MIXED HYPERLIPIDEMIA: ICD-10-CM

## 2024-06-28 DIAGNOSIS — Z79.4 TYPE 2 DIABETES MELLITUS WITH MICROALBUMINURIA, WITH LONG-TERM CURRENT USE OF INSULIN (H): Primary | ICD-10-CM

## 2024-06-29 LAB
CHOLEST SERPL-MCNC: 219 MG/DL
HDLC SERPL-MCNC: 50 MG/DL
LDLC SERPL CALC-MCNC: 145 MG/DL
NONHDLC SERPL-MCNC: 169 MG/DL
TRIGL SERPL-MCNC: 122 MG/DL

## 2024-10-03 ENCOUNTER — MYC MEDICAL ADVICE (OUTPATIENT)
Dept: FAMILY MEDICINE | Facility: CLINIC | Age: 44
End: 2024-10-03
Payer: COMMERCIAL

## 2024-10-03 DIAGNOSIS — E06.3 HYPOTHYROIDISM DUE TO HASHIMOTO'S THYROIDITIS: ICD-10-CM

## 2024-10-03 RX ORDER — LEVOTHYROXINE SODIUM 137 UG/1
137 TABLET ORAL DAILY
Qty: 90 TABLET | Refills: 2 | Status: SHIPPED | OUTPATIENT
Start: 2024-10-03

## 2025-01-18 ENCOUNTER — HEALTH MAINTENANCE LETTER (OUTPATIENT)
Age: 45
End: 2025-01-18

## 2025-02-24 ENCOUNTER — ANCILLARY PROCEDURE (OUTPATIENT)
Dept: MAMMOGRAPHY | Facility: CLINIC | Age: 45
End: 2025-02-24
Attending: FAMILY MEDICINE
Payer: COMMERCIAL

## 2025-02-24 DIAGNOSIS — Z12.31 VISIT FOR SCREENING MAMMOGRAM: ICD-10-CM

## 2025-02-24 PROCEDURE — 77063 BREAST TOMOSYNTHESIS BI: CPT

## 2025-04-10 ENCOUNTER — TRANSFERRED RECORDS (OUTPATIENT)
Dept: HEALTH INFORMATION MANAGEMENT | Facility: CLINIC | Age: 45
End: 2025-04-10
Payer: COMMERCIAL

## 2025-04-10 LAB — RETINOPATHY: NEGATIVE

## 2025-04-15 ENCOUNTER — OFFICE VISIT (OUTPATIENT)
Dept: FAMILY MEDICINE | Facility: CLINIC | Age: 45
End: 2025-04-15
Payer: COMMERCIAL

## 2025-04-15 VITALS
HEIGHT: 67 IN | HEART RATE: 75 BPM | TEMPERATURE: 98 F | DIASTOLIC BLOOD PRESSURE: 86 MMHG | WEIGHT: 213 LBS | BODY MASS INDEX: 33.43 KG/M2 | OXYGEN SATURATION: 97 % | RESPIRATION RATE: 18 BRPM | SYSTOLIC BLOOD PRESSURE: 130 MMHG

## 2025-04-15 DIAGNOSIS — Z79.4 TYPE 2 DIABETES MELLITUS WITH MICROALBUMINURIA, WITH LONG-TERM CURRENT USE OF INSULIN (H): ICD-10-CM

## 2025-04-15 DIAGNOSIS — R80.9 TYPE 2 DIABETES MELLITUS WITH MICROALBUMINURIA, WITH LONG-TERM CURRENT USE OF INSULIN (H): ICD-10-CM

## 2025-04-15 DIAGNOSIS — F41.1 GENERALIZED ANXIETY DISORDER: ICD-10-CM

## 2025-04-15 DIAGNOSIS — Z00.00 ROUTINE PHYSICAL EXAMINATION: Primary | ICD-10-CM

## 2025-04-15 DIAGNOSIS — E78.2 MIXED HYPERLIPIDEMIA: ICD-10-CM

## 2025-04-15 DIAGNOSIS — E28.2 POLYCYSTIC OVARIES: ICD-10-CM

## 2025-04-15 DIAGNOSIS — E06.3 HYPOTHYROIDISM DUE TO HASHIMOTO THYROIDITIS: ICD-10-CM

## 2025-04-15 DIAGNOSIS — E11.29 TYPE 2 DIABETES MELLITUS WITH MICROALBUMINURIA, WITH LONG-TERM CURRENT USE OF INSULIN (H): ICD-10-CM

## 2025-04-15 DIAGNOSIS — K86.2 CYST OF PANCREAS: ICD-10-CM

## 2025-04-15 DIAGNOSIS — F10.10 ALCOHOL ABUSE: ICD-10-CM

## 2025-04-15 DIAGNOSIS — F32.1 MAJOR DEPRESSIVE DISORDER, SINGLE EPISODE, MODERATE (H): ICD-10-CM

## 2025-04-15 LAB
CHOLEST SERPL-MCNC: 274 MG/DL
ERYTHROCYTE [DISTWIDTH] IN BLOOD BY AUTOMATED COUNT: 12.5 % (ref 10–15)
EST. AVERAGE GLUCOSE BLD GHB EST-MCNC: 117 MG/DL
FASTING STATUS PATIENT QL REPORTED: ABNORMAL
HBA1C MFR BLD: 5.7 % (ref 0–5.6)
HCT VFR BLD AUTO: 40.1 % (ref 35–47)
HDLC SERPL-MCNC: 47 MG/DL
HGB BLD-MCNC: 13.9 G/DL (ref 11.7–15.7)
HOLD SPECIMEN: NORMAL
LDLC SERPL CALC-MCNC: 196 MG/DL
MCH RBC QN AUTO: 32.9 PG (ref 26.5–33)
MCHC RBC AUTO-ENTMCNC: 34.7 G/DL (ref 31.5–36.5)
MCV RBC AUTO: 95 FL (ref 78–100)
NONHDLC SERPL-MCNC: 227 MG/DL
PLATELET # BLD AUTO: 161 10E3/UL (ref 150–450)
RBC # BLD AUTO: 4.23 10E6/UL (ref 3.8–5.2)
T4 FREE SERPL-MCNC: 1.28 NG/DL (ref 0.9–1.7)
TRIGL SERPL-MCNC: 153 MG/DL
TSH SERPL DL<=0.005 MIU/L-ACNC: 10.9 UIU/ML (ref 0.3–4.2)
WBC # BLD AUTO: 4.6 10E3/UL (ref 4–11)

## 2025-04-15 PROCEDURE — 84439 ASSAY OF FREE THYROXINE: CPT | Performed by: FAMILY MEDICINE

## 2025-04-15 PROCEDURE — G2211 COMPLEX E/M VISIT ADD ON: HCPCS | Performed by: FAMILY MEDICINE

## 2025-04-15 PROCEDURE — 85027 COMPLETE CBC AUTOMATED: CPT | Performed by: FAMILY MEDICINE

## 2025-04-15 PROCEDURE — 83036 HEMOGLOBIN GLYCOSYLATED A1C: CPT | Performed by: FAMILY MEDICINE

## 2025-04-15 PROCEDURE — 36415 COLL VENOUS BLD VENIPUNCTURE: CPT | Performed by: FAMILY MEDICINE

## 2025-04-15 PROCEDURE — 3075F SYST BP GE 130 - 139MM HG: CPT | Performed by: FAMILY MEDICINE

## 2025-04-15 PROCEDURE — 99396 PREV VISIT EST AGE 40-64: CPT | Performed by: FAMILY MEDICINE

## 2025-04-15 PROCEDURE — 80061 LIPID PANEL: CPT | Performed by: FAMILY MEDICINE

## 2025-04-15 PROCEDURE — 3079F DIAST BP 80-89 MM HG: CPT | Performed by: FAMILY MEDICINE

## 2025-04-15 PROCEDURE — 84443 ASSAY THYROID STIM HORMONE: CPT | Performed by: FAMILY MEDICINE

## 2025-04-15 PROCEDURE — 1126F AMNT PAIN NOTED NONE PRSNT: CPT | Performed by: FAMILY MEDICINE

## 2025-04-15 PROCEDURE — 99214 OFFICE O/P EST MOD 30 MIN: CPT | Mod: 25 | Performed by: FAMILY MEDICINE

## 2025-04-15 RX ORDER — AMPICILLIN TRIHYDRATE 250 MG
1200 CAPSULE ORAL DAILY
COMMUNITY
Start: 2024-04-01

## 2025-04-15 RX ORDER — METFORMIN HYDROCHLORIDE 500 MG/1
500 TABLET, EXTENDED RELEASE ORAL
Qty: 90 TABLET | Refills: 4 | Status: SHIPPED | OUTPATIENT
Start: 2025-04-15

## 2025-04-15 RX ORDER — LEVOTHYROXINE SODIUM 137 UG/1
137 TABLET ORAL DAILY
Qty: 90 TABLET | Refills: 4 | Status: SHIPPED | OUTPATIENT
Start: 2025-04-15 | End: 2025-04-16

## 2025-04-15 RX ORDER — LOSARTAN POTASSIUM 25 MG/1
25 TABLET ORAL DAILY
Qty: 90 TABLET | Refills: 4 | Status: SHIPPED | OUTPATIENT
Start: 2025-04-15

## 2025-04-15 SDOH — HEALTH STABILITY: PHYSICAL HEALTH: ON AVERAGE, HOW MANY MINUTES DO YOU ENGAGE IN EXERCISE AT THIS LEVEL?: 30 MIN

## 2025-04-15 SDOH — HEALTH STABILITY: PHYSICAL HEALTH: ON AVERAGE, HOW MANY DAYS PER WEEK DO YOU ENGAGE IN MODERATE TO STRENUOUS EXERCISE (LIKE A BRISK WALK)?: 6 DAYS

## 2025-04-15 ASSESSMENT — PATIENT HEALTH QUESTIONNAIRE - PHQ9
SUM OF ALL RESPONSES TO PHQ QUESTIONS 1-9: 14
SUM OF ALL RESPONSES TO PHQ QUESTIONS 1-9: 14
10. IF YOU CHECKED OFF ANY PROBLEMS, HOW DIFFICULT HAVE THESE PROBLEMS MADE IT FOR YOU TO DO YOUR WORK, TAKE CARE OF THINGS AT HOME, OR GET ALONG WITH OTHER PEOPLE: VERY DIFFICULT

## 2025-04-15 ASSESSMENT — SOCIAL DETERMINANTS OF HEALTH (SDOH): HOW OFTEN DO YOU GET TOGETHER WITH FRIENDS OR RELATIVES?: ONCE A WEEK

## 2025-04-15 ASSESSMENT — PAIN SCALES - GENERAL: PAINLEVEL_OUTOF10: NO PAIN (0)

## 2025-04-15 NOTE — PATIENT INSTRUCTIONS
Patient Education   Preventive Care Advice   This is general advice given by our system to help you stay healthy. However, your care team may have specific advice just for you. Please talk to your care team about your preventive care needs.  Nutrition  Eat 5 or more servings of fruits and vegetables each day.  Try wheat bread, brown rice and whole grain pasta (instead of white bread, rice, and pasta).  Get enough calcium and vitamin D. Check the label on foods and aim for 100% of the RDA (recommended daily allowance).  Lifestyle  Exercise at least 150 minutes each week  (30 minutes a day, 5 days a week).  Do muscle strengthening activities 2 days a week. These help control your weight and prevent disease.  No smoking.  Wear sunscreen to prevent skin cancer.  Have a dental exam and cleaning every 6 months.  Yearly exams  See your health care team every year to talk about:  Any changes in your health.  Any medicines your care team has prescribed.  Preventive care, family planning, and ways to prevent chronic diseases.  Shots (vaccines)   HPV shots (up to age 26), if you've never had them before.  Hepatitis B shots (up to age 59), if you've never had them before.  COVID-19 shot: Get this shot when it's due.  Flu shot: Get a flu shot every year.  Tetanus shot: Get a tetanus shot every 10 years.  Pneumococcal, hepatitis A, and RSV shots: Ask your care team if you need these based on your risk.  Shingles shot (for age 50 and up)  General health tests  Diabetes screening:  Starting at age 35, Get screened for diabetes at least every 3 years.  If you are younger than age 35, ask your care team if you should be screened for diabetes.  Cholesterol test: At age 39, start having a cholesterol test every 5 years, or more often if advised.  Bone density scan (DEXA): At age 50, ask your care team if you should have this scan for osteoporosis (brittle bones).  Hepatitis C: Get tested at least once in your life.  STIs (sexually  transmitted infections)  Before age 24: Ask your care team if you should be screened for STIs.  After age 24: Get screened for STIs if you're at risk. You are at risk for STIs (including HIV) if:  You are sexually active with more than one person.  You don't use condoms every time.  You or a partner was diagnosed with a sexually transmitted infection.  If you are at risk for HIV, ask about PrEP medicine to prevent HIV.  Get tested for HIV at least once in your life, whether you are at risk for HIV or not.  Cancer screening tests  Cervical cancer screening: If you have a cervix, begin getting regular cervical cancer screening tests starting at age 21.  Breast cancer scan (mammogram): If you've ever had breasts, begin having regular mammograms starting at age 40. This is a scan to check for breast cancer.  Colon cancer screening: It is important to start screening for colon cancer at age 45.  Have a colonoscopy test every 10 years (or more often if you're at risk) Or, ask your provider about stool tests like a FIT test every year or Cologuard test every 3 years.  To learn more about your testing options, visit:   .  For help making a decision, visit:   https://bit.ly/tr75244.  Prostate cancer screening test: If you have a prostate, ask your care team if a prostate cancer screening test (PSA) at age 55 is right for you.  Lung cancer screening: If you are a current or former smoker ages 50 to 80, ask your care team if ongoing lung cancer screenings are right for you.  For informational purposes only. Not to replace the advice of your health care provider. Copyright   2023 [facility]. All rights reserved. Clinically reviewed by the [facility] Transitions Program. [employer] [id number] - REV 01/24.  Learning About Stress  What is stress?     Stress is your body's response to a hard situation. Your body can have a physical, emotional, or mental response. Stress is a fact of life for most people, and it  affects everyone differently. What causes stress for you may not be stressful for someone else.  A lot of things can cause stress. You may feel stress when you go on a job interview, take a test, or run a race. This kind of short-term stress is normal and even useful. It can help you if you need to work hard or react quickly. For example, stress can help you finish an important job on time.  Long-term stress is caused by ongoing stressful situations or events. Examples of long-term stress include long-term health problems, ongoing problems at work, or conflicts in your family. Long-term stress can harm your health.  How does stress affect your health?  When you are stressed, your body responds as though you are in danger. It makes hormones that speed up your heart, make you breathe faster, and give you a burst of energy. This is called the fight-or-flight stress response. If the stress is over quickly, your body goes back to normal and no harm is done.  But if stress happens too often or lasts too long, it can have bad effects. Long-term stress can make you more likely to get sick, and it can make symptoms of some diseases worse. If you tense up when you are stressed, you may develop neck, shoulder, or low back pain. Stress is linked to high blood pressure and heart disease.  Stress also harms your emotional health. It can make you butler, tense, or depressed. Your relationships may suffer, and you may not do well at work or school.  What can you do to manage stress?  You can try these things to help manage stress:   Do something active. Exercise or activity can help reduce stress. Walking is a great way to get started. Even everyday activities such as housecleaning or yard work can help.  Try yoga or renetta chi. These techniques combine exercise and meditation. You may need some training at first to learn them.  Do something you enjoy. For example, listen to music or go to a movie. Practice your hobby or do volunteer  "work.  Meditate. This can help you relax, because you are not worrying about what happened before or what may happen in the future.  Do guided imagery. Imagine yourself in any setting that helps you feel calm. You can use online videos, books, or a teacher to guide you.  Do breathing exercises. For example:  From a standing position, bend forward from the waist with your knees slightly bent. Let your arms dangle close to the floor.  Breathe in slowly and deeply as you return to a standing position. Roll up slowly and lift your head last.  Hold your breath for just a few seconds in the standing position.  Breathe out slowly and bend forward from the waist.  Let your feelings out. Talk, laugh, cry, and express anger when you need to. Talking with supportive friends or family, a counselor, or a geoffrey leader about your feelings is a healthy way to relieve stress. Avoid discussing your feelings with people who make you feel worse.  Write. It may help to write about things that are bothering you. This helps you find out how much stress you feel and what is causing it. When you know this, you can find better ways to cope.  What can you do to prevent stress?  You might try some of these things to help prevent stress:  Manage your time. This helps you find time to do the things you want and need to do.  Get enough sleep. Your body recovers from the stresses of the day while you are sleeping.  Get support. Your family, friends, and community can make a difference in how you experience stress.  Limit your news feed. Avoid or limit time on social media or news that may make you feel stressed.  Do something active. Exercise or activity can help reduce stress. Walking is a great way to get started.  Where can you learn more?  Go to https://www.Riskthinktank.net/patiented  Enter N032 in the search box to learn more about \"Learning About Stress.\"  Current as of: October 24, 2024  Content Version: 14.4 2024-2025 [employer].   Care instructions adapted under license by your healthcare professional. If you have questions about a medical condition or this instruction, always ask your healthcare professional. [employer] disclaims any warranty or liability for your use of this information.    Learning About Depression Screening  What is depression screening?  Depression screening is a way to see if you have depression symptoms. It may be done by a doctor or counselor. It's often part of a routine checkup. That's because your mental health is just as important as your physical health.  Depression is a mental health condition that affects how you feel, think, and act. You may:  Have less energy.  Lose interest in your daily activities.  Feel sad and grouchy for a long time.  Depression is very common. It affects people of all ages.  Many things can lead to depression. Some people become depressed after they have a stroke or find out they have a major illness like cancer or heart disease. The death of a loved one or a breakup may lead to depression. It can run in families. Most experts believe that a combination of inherited genes and stressful life events can cause it.  What happens during screening?  You may be asked to fill out a form about your depression symptoms. You and the doctor will discuss your answers. The doctor may ask you more questions to learn more about how you think, act, and feel.  What happens after screening?  If you have symptoms of depression, your doctor will talk to you about your options.  Doctors usually treat depression with medicines or counseling. Often, combining the two works best. Many people don't get help because they think that they'll get over the depression on their own. But people with depression may not get better unless they get treatment.  The cause of depression is not well understood. There may be many factors involved. But if you have depression, it's not your fault.  A serious  "symptom of depression is thinking about death or suicide. If you or someone you care about talks about this or about feeling hopeless, get help right away.  It's important to know that depression can be treated. Medicine, counseling, and self-care may help.  Where can you learn more?  Go to https://www.[employer].net/patiented  Enter T185 in the search box to learn more about \"Learning About Depression Screening.\"  Current as of: July 31, 2024  Content Version: 14.4    2303-3272 [employer]   Care instructions adapted under license by your healthcare professional. If you have questions about a medical condition or this instruction, always ask your healthcare professional. [employer] disclaims any warranty or liability for your use of this information.       "

## 2025-04-15 NOTE — PROGRESS NOTES
Preventive Care Visit  [facility]  Ramya Seaman MD, Family Medicine  Apr 15, 2025      Assessment & Plan     Routine physical examination  Encouraged healthy lifestyle habits including regular exercise, healthy eating habits, and adequate calcium and vitamin D intake.  Discussed immunizations, she declines today but will consider.  Up-to-date with Pap smear screening, mammogram screening, colon cancer screening.    Type 2 diabetes mellitus with microalbuminuria, with long-term current use of insulin (H)  Courage efforts at healthy lifestyle habits.  A1c at goal.  She had her eye exam last week, will await records.  Follow-up in 6 months.  - Hemoglobin A1c; Standing  - Extra Tube; Future  - Comprehensive metabolic panel; Future  - TSH with free T4 reflex; Future  - Lipid panel reflex to direct LDL Fasting; Future  - MR Pancreas wo & w Contrast; Future  - CBC with platelets; Future  - Extra Tube; Future  - Hemoglobin A1c  - Extra Tube  - losartan (COZAAR) 25 MG tablet; Take 1 tablet (25 mg) by mouth daily.  - metFORMIN (GLUCOPHAGE XR) 500 MG 24 hr tablet; Take 1 tablet (500 mg) by mouth daily (with dinner).    Major depressive disorder, single episode, moderate (H)  Generalized Anxiety Disorder  Encouraged continued efforts at healthy lifestyle habits.  Discussed options of medications, cognitive behavioral therapy.  She declines but will keep this in mind.  Notes passive thoughts of not living or not experiencing current symptoms, but denies suicidal ideation or suicidal thoughts.  - Hemoglobin A1c; Standing  - Extra Tube; Future  - Comprehensive metabolic panel; Future  - TSH with free T4 reflex; Future  - Lipid panel reflex to direct LDL Fasting; Future  - MR Pancreas wo & w Contrast; Future  - CBC with platelets; Future  - Extra Tube; Future  - Hemoglobin A1c  - Extra Tube    Alcohol abuse  Remains abstinent.  - Hemoglobin A1c; Standing  - Extra Tube; Future  - Comprehensive metabolic  panel; Future  - TSH with free T4 reflex; Future  - Lipid panel reflex to direct LDL Fasting; Future  - MR Pancreas wo & w Contrast; Future  - CBC with platelets; Future  - Extra Tube; Future  - Hemoglobin A1c  - Extra Tube    Polycystic Ovarian Syndrome  Will continue to monitor menses, no issues currently.  - Hemoglobin A1c; Standing  - Extra Tube; Future  - Comprehensive metabolic panel; Future  - TSH with free T4 reflex; Future  - Lipid panel reflex to direct LDL Fasting; Future  - MR Pancreas wo & w Contrast; Future  - CBC with platelets; Future  - Extra Tube; Future  - Hemoglobin A1c  - Extra Tube    Mixed hyperlipidemia  Courage healthy lifestyle habits.  We will check fasting lipids today.  - Hemoglobin A1c; Standing  - Extra Tube; Future  - Comprehensive metabolic panel; Future  - TSH with free T4 reflex; Future  - Lipid panel reflex to direct LDL Fasting; Future  - MR Pancreas wo & w Contrast; Future  - CBC with platelets; Future  - Extra Tube; Future  - Hemoglobin A1c  - Extra Tube    Hypothyroidism due to Hashimoto thyroiditis  Thyroid.  Continue levothyroxine.  We will check TSH today.  - Hemoglobin A1c; Standing  - Extra Tube; Future  - Comprehensive metabolic panel; Future  - TSH with free T4 reflex; Future  - Lipid panel reflex to direct LDL Fasting; Future  - MR Pancreas wo & w Contrast; Future  - CBC with platelets; Future  - Extra Tube; Future  - Hemoglobin A1c  - Extra Tube    Cyst of pancreas  Asymptomatic.  Order placed for follow-up MRI.  She is uncertain if she is brave enough to have this performed again due to anxiety regarding the imaging and anxiety regarding the results.  Encourage completion.  - Hemoglobin A1c; Standing  - Extra Tube; Future  - Comprehensive metabolic panel; Future  - TSH with free T4 reflex; Future  - Lipid panel reflex to direct LDL Fasting; Future  - MR Pancreas wo & w Contrast; Future  - CBC with platelets; Future  - Extra Tube; Future  - Hemoglobin A1c  - Extra  "Tube        The longitudinal plan of care for the diagnosis(es)/condition(s) as documented were addressed during this visit. Due to the added complexity in care, I will continue to support Dhaval in the subsequent management and with ongoing continuity of care.    BMI  Estimated body mass index is 33.86 kg/m  as calculated from the following:    Height as of this encounter: 1.689 m (5' 6.5\").    Weight as of this encounter: 96.6 kg (213 lb).       Depression Screening Follow Up        4/15/2025     5:21 AM   PHQ   PHQ-9 Total Score 14    Q9: Thoughts of better off dead/self-harm past 2 weeks Several days   F/U: Thoughts of suicide or self-harm No   F/U: Safety concerns No       Patient-reported                     Subjective   Dhaval is a 45 year old, presenting for the following:  Physical (Fasting, no pap), Mental health, and Menopause         Via the Health Maintenance questionnaire, the patient has reported the following services have been completed -Eye Exam: [facility] 2025-04-10, this information has been sent to the abstraction team.    HPI  History of Present Illness-  Menopause  - Period in October was 2 weeks late, normal flow when it occurred  - Spotted for half a day in November and December  - No period until March 17, 2025, then again on April 15, 2025  - Occasional night sweats and hot flashes  - Memory issues and emotional instability  - Increased facial hair, plucking chin hair  - Gained 17 lbs rapidly, started intermittent fasting 9-10 days ago, lost 5 lbs  - Previously worked out 45 minutes to an hour daily and ate well, but weight was not decreasing  - Mother experienced menopause at age 52    Mental Health  - Panic attacks started early to mid-February, lasted for about 6 weeks  - Emotional distress due to family and work-related stressors  - Difficulty managing mental health, prefers natural supplements over medication  - Experiences thoughts of not wanting to live, but no intent to " act on them  - Struggles with attention span and memory at work    Misc  - Sick in the first week of February, which disrupted routine  - A1c level at 5.7% despite recent challenges         Advance Care Planning  Patient does not have a Health Care Directive: Discussed advance care planning with patient; information given to patient to review.      4/15/2025   General Health   How would you rate your overall physical health? (!) FAIR   Feel stress (tense, anxious, or unable to sleep) Very much   (!) STRESS CONCERN      4/15/2025   Nutrition   Three or more servings of calcium each day? Yes   Diet: Diabetic    Vegetarian/vegan   How many servings of fruit and vegetables per day? 4 or more   How many sweetened beverages each day? 0-1       Multiple values from one day are sorted in reverse-chronological order         4/15/2025   Exercise   Days per week of moderate/strenous exercise 6 days   Average minutes spent exercising at this level 30 min         4/15/2025   Social Factors   Frequency of gathering with friends or relatives Once a week   Worry food won't last until get money to buy more No   Food not last or not have enough money for food? No   Do you have housing? (Housing is defined as stable permanent housing and does not include staying ouside in a car, in a tent, in an abandoned building, in an overnight shelter, or couch-surfing.) Yes   Are you worried about losing your housing? No   Lack of transportation? No   Unable to get utilities (heat,electricity)? No         4/15/2025   Dental   Dentist two times every year? Yes           3/5/2024   TB Screening   Were you born outside of the [place]? No         Today's PHQ-9 Score:       4/15/2025     5:21 AM   PHQ-9 SCORE   PHQ-9 Total Score MyChart 14 (Moderate depression)   PHQ-9 Total Score 14        Patient-reported         4/15/2025   Substance Use   Alcohol more than 3/day or more than 7/wk Not Applicable   Do you use any other substances recreationally? No      Social History     Tobacco Use    Smoking status: Former     Current packs/day: 0.00     Types: Cigarettes     Quit date: 2014     Years since quittin.1    Smokeless tobacco: Never    Tobacco comments:     FORMAL SMOKER   Vaping Use    Vaping status: Never Used   Substance Use Topics    Alcohol use: Not Currently     Alcohol/week: 1.0 standard drink of alcohol     Comment: Alcoholic Drinks/day: sober 7 months    Drug use: No           2025   LAST FHS-7 RESULTS   1st degree relative breast or ovarian cancer No   Any relative bilateral breast cancer No   Any male have breast cancer No   Any ONE woman have BOTH breast AND ovarian cancer No   Any woman with breast cancer before 50yrs No   2 or more relatives with breast AND/OR ovarian cancer No   2 or more relatives with breast AND/OR bowel cancer No        Mammogram Screening - Mammogram every 1-2 years updated in Health Maintenance based on mutual decision making        4/15/2025   STI Screening   New sexual partner(s) since last STI/HIV test? No     History of abnormal Pap smear: No - age 30- 64 PAP with HPV every 5 years recommended        Latest Ref Rng & Units 2023     9:56 AM 7/3/2017     8:04 AM   PAP / HPV   PAP  Negative for Intraepithelial Lesion or Malignancy (NILM)  Negative for squamous intraepithelial lesion or malignancy  Electronically signed by Velma Patton CT (ASCP) on 2017 at  2:24 PM      HPV 16 DNA Negative Negative     HPV 18 DNA Negative Negative     Other HR HPV Negative Negative       ASCVD Risk   The 10-year ASCVD risk score (Zoe CLAUDIO, et al., 2019) is: 2.5%    Values used to calculate the score:      Age: 45 years      Sex: Female      Is Non- : No      Diabetic: Yes      Tobacco smoker: No      Systolic Blood Pressure: 130 mmHg      Is BP treated: No      HDL Cholesterol: 50 mg/dL      Total Cholesterol: 219 mg/dL        4/15/2025   Contraception/Family Planning   Questions  about contraception or family planning No        Reviewed and updated as needed this visit by Provider                    Past Medical History:   Diagnosis Date    Anxiety     Cellulitis , 2014    Diabetes mellitus (H) 2014    Pre- diabetic    Hirsutism     HLD (hyperlipidemia)     Hypothyroid     Obesity     Polycystic ovarian disease      Past Surgical History:   Procedure Laterality Date    ABDOMEN SURGERY      C-Sections     SECTION  ,      OB History   No obstetric history on file.     Lab work is in process  Labs reviewed in EPIC  BP Readings from Last 3 Encounters:   04/15/25 130/86   24 138/86   23 138/86    Wt Readings from Last 3 Encounters:   04/15/25 96.6 kg (213 lb)   24 93.9 kg (207 lb)   23 98.4 kg (217 lb)                  Patient Active Problem List   Diagnosis    Hypothyroidism    Hyperlipidemia    Generalized Anxiety Disorder    Polycystic Ovarian Syndrome    Type 2 diabetes mellitus with microalbuminuria, with long-term current use of insulin (H)    Alcohol abuse    Major depressive disorder, single episode, moderate (H)    Cyst of pancreas     Past Surgical History:   Procedure Laterality Date    ABDOMEN SURGERY      C-Sections     SECTION  ,        Social History     Tobacco Use    Smoking status: Former     Current packs/day: 0.00     Types: Cigarettes     Quit date: 2014     Years since quittin.1    Smokeless tobacco: Never    Tobacco comments:     FORMAL SMOKER   Substance Use Topics    Alcohol use: Not Currently     Alcohol/week: 1.0 standard drink of alcohol     Comment: Alcoholic Drinks/day: sober 7 months     Family History   Problem Relation Age of Onset    Liver Cancer Father     Colon Cancer Father     Breast Cancer Maternal Grandmother          Current Outpatient Medications   Medication Sig Dispense Refill    ACCU-CHEK COMPACT TEST Strp [ACCU-CHEK COMPACT TEST STRP] Test daily before all meals/snacks and once  "before bedtime. 100 strip 11    blood-glucose meter (ACCU-CHEK JOÃO) Misc [BLOOD-GLUCOSE METER (ACCU-CHEK JOÃO) MISC] Use 1 Device As Directed daily. 1 each 1    generic lancets (ACCU-CHEK MULTICLIX LANCET) [GENERIC LANCETS (ACCU-CHEK MULTICLIX LANCET)] Test daily, varying times. 100 each 3    levothyroxine (SYNTHROID/LEVOTHROID) 137 MCG tablet Take 1 tablet (137 mcg) by mouth daily. 90 tablet 4    losartan (COZAAR) 25 MG tablet Take 1 tablet (25 mg) by mouth daily. 90 tablet 4    metFORMIN (GLUCOPHAGE XR) 500 MG 24 hr tablet Take 1 tablet (500 mg) by mouth daily (with dinner). 90 tablet 4    red yeast rice 600 MG CAPS Take 1,200 mg by mouth daily. Two 600mg tablets daily       No Known Allergies  Recent Labs   Lab Test 04/15/25  0717 06/27/24  0725 03/05/24  1717 03/05/24  1458 02/27/23  0905 05/04/22  0951 05/04/22  0951 12/15/21  1645 05/17/21  0900 05/17/21  0819 10/20/20  0735   A1C 5.7* 5.2  --  5.3 5.4  --  5.3   < >  --    < > 5.8*   LDL  --  145* 172*  --  185*  --  176*  --  85  --  72   HDL  --  50 50  --  46*  --  54  --  56  --  47*   TRIG  --  122 74  --  141  --  101  --  80  --  94   ALT  --   --  34  --  27  --  20  --  25  --  36   CR  --   --  0.98*  --  0.90  --  0.84   < > 0.87  --  0.83   GFRESTIMATED  --   --  72  --  80  --  87   < > >60  --  >60   GFRESTBLACK  --   --   --   --   --   --   --   --  >60  --  >60   POTASSIUM  --   --  4.1  --  4.5  --  4.5  --  4.4  --  4.6   TSH  --  1.73 0.17*  --  0.58   < >  --    < > 6.15*  --  0.90    < > = values in this interval not displayed.          Review of Systems  Constitutional, neuro, ENT, endocrine, pulmonary, cardiac, gastrointestinal, genitourinary, musculoskeletal, integument and psychiatric systems are negative, except as otherwise noted.     Objective    Exam  /86   Pulse 75   Temp 98  F (36.7  C) (Oral)   Resp 18   Ht 1.689 m (5' 6.5\")   Wt 96.6 kg (213 lb)   LMP 04/15/2025 (Exact Date)   SpO2 97%   BMI 33.86 kg/m   " "  Estimated body mass index is 33.86 kg/m  as calculated from the following:    Height as of this encounter: 1.689 m (5' 6.5\").    Weight as of this encounter: 96.6 kg (213 lb).    Physical Exam  Physical Examination: General appearance - alert, well appearing, and in no distress, oriented to person, place, and time and normal appearing weight  Mental status - alert, oriented to person, place, and time, normal mood, behavior, speech, dress, motor activity, and thought processes  Eyes - pupils equal and reactive, extraocular eye movements intact  Ears - bilateral TM's and external ear canals normal  Nose - normal and patent, no erythema, discharge or polyps  Mouth - mucous membranes moist, pharynx normal without lesions  Neck - supple, no significant adenopathy  Lymphatics - no palpable lymphadenopathy, no hepatosplenomegaly  Chest - clear to auscultation, no wheezes, rales or rhonchi, symmetric air entry  Heart - normal rate, regular rhythm, normal S1, S2, no murmurs, rubs, clicks or gallops  Abdomen - soft, nontender, nondistended, no masses or organomegaly  Breasts -declines  Neurological - alert, oriented, normal speech, no focal findings or movement disorder noted  Musculoskeletal - no joint tenderness, deformity or swelling  Extremities - peripheral pulses normal, no pedal edema, no clubbing or cyanosis  Skin - normal coloration and turgor, no rashes, no suspicious skin lesions noted       Office Visit on 04/15/2025   Component Date Value Ref Range Status    Estimated Average Glucose 04/15/2025 117 (H)  <117 mg/dL Final    Hemoglobin A1C 04/15/2025 5.7 (H)  0.0 - 5.6 % Final    Normal <5.7%   Prediabetes 5.7-6.4%    Diabetes 6.5% or higher     Note: Adopted from ADA consensus guidelines.        Signed Electronically by: Ramya Seaman MD    "

## 2025-04-16 DIAGNOSIS — E06.3 HYPOTHYROIDISM DUE TO HASHIMOTO THYROIDITIS: Primary | ICD-10-CM

## 2025-04-16 RX ORDER — LEVOTHYROXINE SODIUM 150 UG/1
150 TABLET ORAL DAILY
Qty: 90 TABLET | Refills: 1 | Status: SHIPPED | OUTPATIENT
Start: 2025-04-16

## 2025-04-17 NOTE — RESULT ENCOUNTER NOTE
Your thyroid is undertreated.  Increase your levothyroxine dose to 150 mcg daily, and let's recheck your thyroid at a lab only visit in 4-6 weeks.    Your cholesterol is quite high and has increased since last year.  How did things go with trying red rice yeast last year?  I recommend considering an alternative statin if you did not tolerate red rice yeast or if you are currently taking it.  I would recommend pravastatin, which is less likely than atorvastatin to cause muscle or joint aches.

## 2025-04-20 ENCOUNTER — E-VISIT (OUTPATIENT)
Dept: URGENT CARE | Facility: CLINIC | Age: 45
End: 2025-04-20
Payer: COMMERCIAL

## 2025-04-20 DIAGNOSIS — E78.2 MIXED HYPERLIPIDEMIA: Primary | ICD-10-CM

## 2025-04-20 DIAGNOSIS — H10.31 ACUTE CONJUNCTIVITIS OF RIGHT EYE, UNSPECIFIED ACUTE CONJUNCTIVITIS TYPE: Primary | ICD-10-CM

## 2025-04-20 RX ORDER — POLYMYXIN B SULFATE AND TRIMETHOPRIM 1; 10000 MG/ML; [USP'U]/ML
SOLUTION OPHTHALMIC
Qty: 10 ML | Refills: 0 | Status: SHIPPED | OUTPATIENT
Start: 2025-04-20 | End: 2025-04-27

## 2025-04-21 NOTE — PATIENT INSTRUCTIONS
Thank you for choosing us for your care. I have placed an order for a prescription so that you can start treatment. View your full visit summary for details by clicking on the link below. Your pharmacist will able to address any questions you may have about the medication.     If you re not feeling better within 2-3 days, please schedule an appointment.  You can schedule an appointment right here in [facility], or call [phone]  If the visit is for the same symptoms as your eVisit, we ll refund the cost of your eVisit if seen within seven days.    Pinkeye: Care Instructions  Overview     Pinkeye is redness and swelling of the eye surface and the conjunctiva (the lining of the eyelid and the covering of the white part of the eye). Pinkeye is also called conjunctivitis. Pinkeye is often caused by infection with bacteria or a virus. Dry air, allergies, smoke, and chemicals are other common causes.  Pinkeye often gets better on its own in 7 to 10 days. Antibiotics only help if the pinkeye is caused by bacteria. Pinkeye caused by infection spreads easily. If an allergy or chemical is causing pinkeye, it will not go away unless you can avoid whatever is causing it.  Follow-up care is a key part of your treatment and safety. Be sure to make and go to all appointments, and call your doctor if you are having problems. It's also a good idea to know your test results and keep a list of the medicines you take.  How can you care for yourself at home?  Wash your hands often. Always wash them before and after you treat pinkeye or touch your eyes or face.  Use moist cotton or a clean, wet cloth to remove crust. Wipe from the inside corner of the eye to the outside. Use a clean part of the cloth for each wipe.  Put cold or warm wet cloths on your eye a few times a day if the eye hurts.  Do not wear contact lenses or eye makeup until the pinkeye is gone. Throw away any eye makeup you were using when you got pinkeye. Clean your  "contacts and storage case. If you wear disposable contacts, use a new pair when your eye has cleared and it is safe to wear contacts again.  If the doctor gave you antibiotic ointment or eyedrops, use them as directed. Use the medicine for as long as instructed, even if your eye starts looking better soon. Keep the bottle tip clean, and do not let it touch the eye area.  To put in eyedrops or ointment:  Tilt your head back, and pull your lower eyelid down with one finger.  Drop or squirt the medicine inside the lower lid.  Close your eye for 30 to 60 seconds to let the drops or ointment move around.  Do not touch the ointment or dropper tip to your eyelashes or any other surface.  Do not share towels, pillows, or washcloths while you have pinkeye.  When should you call for help?   Call your doctor now or seek immediate medical care if:    You have pain in your eye, not just irritation on the surface.     You have a change in vision or loss of vision.     You have an increase in discharge from the eye.     Your eye has not started to improve or begins to get worse within 48 hours after you start using antibiotics.     Pinkeye lasts longer than 7 days.   Watch closely for changes in your health, and be sure to contact your doctor if you have any problems.  Where can you learn more?  Go to https://www.[employer].net/patiented  Enter Y392 in the search box to learn more about \"Pinkeye: Care Instructions.\"  Current as of: July 31, 2024  Content Version: 14.4    2165-6235 [employer].   Care instructions adapted under license by your healthcare professional. If you have questions about a medical condition or this instruction, always ask your healthcare professional. [employer] disclaims any warranty or liability for your use of this information.    "

## 2025-05-12 DIAGNOSIS — R80.9 TYPE 2 DIABETES MELLITUS WITH MICROALBUMINURIA, WITH LONG-TERM CURRENT USE OF INSULIN (H): ICD-10-CM

## 2025-05-12 DIAGNOSIS — Z79.4 TYPE 2 DIABETES MELLITUS WITH MICROALBUMINURIA, WITH LONG-TERM CURRENT USE OF INSULIN (H): ICD-10-CM

## 2025-05-12 DIAGNOSIS — E11.29 TYPE 2 DIABETES MELLITUS WITH MICROALBUMINURIA, WITH LONG-TERM CURRENT USE OF INSULIN (H): ICD-10-CM

## 2025-05-12 RX ORDER — METFORMIN HYDROCHLORIDE 500 MG/1
500 TABLET, EXTENDED RELEASE ORAL
Qty: 90 TABLET | Refills: 4 | OUTPATIENT
Start: 2025-05-12

## 2025-05-12 NOTE — TELEPHONE ENCOUNTER
Refill Request  Medication name: Pending Prescriptions:                       Disp   Refills    metFORMIN (GLUCOPHAGE XR) 500 MG 24 hr ta*90 tab*4            Sig: Take 1 tablet (500 mg) by mouth daily (with           dinner).    Requested Pharmacy:  [id number] - [place], [place] - [address] AT [facility] [address]

## 2025-05-21 DIAGNOSIS — E11.29 TYPE 2 DIABETES MELLITUS WITH MICROALBUMINURIA, WITH LONG-TERM CURRENT USE OF INSULIN (H): ICD-10-CM

## 2025-05-21 DIAGNOSIS — R80.9 TYPE 2 DIABETES MELLITUS WITH MICROALBUMINURIA, WITH LONG-TERM CURRENT USE OF INSULIN (H): ICD-10-CM

## 2025-05-21 DIAGNOSIS — Z79.4 TYPE 2 DIABETES MELLITUS WITH MICROALBUMINURIA, WITH LONG-TERM CURRENT USE OF INSULIN (H): ICD-10-CM

## 2025-05-21 RX ORDER — LOSARTAN POTASSIUM 25 MG/1
25 TABLET ORAL DAILY
Qty: 90 TABLET | Refills: 4 | OUTPATIENT
Start: 2025-05-21

## 2025-05-21 NOTE — TELEPHONE ENCOUNTER
Refill Request  Medication name: Pending Prescriptions:                       Disp   Refills    losartan (COZAAR) 25 MG tablet            90 tab*4            Sig: Take 1 tablet (25 mg) by mouth daily.    Requested Pharmacy:  [id number] - [place], [place] - [address] AT [facility] [address]

## 2025-07-16 DIAGNOSIS — E06.3 HYPOTHYROIDISM DUE TO HASHIMOTO THYROIDITIS: ICD-10-CM

## 2025-07-16 RX ORDER — LEVOTHYROXINE SODIUM 150 UG/1
150 TABLET ORAL DAILY
Qty: 90 TABLET | Refills: 1 | OUTPATIENT
Start: 2025-07-16

## 2025-08-06 ENCOUNTER — HOSPITAL ENCOUNTER (OUTPATIENT)
Dept: MRI IMAGING | Facility: HOSPITAL | Age: 45
Discharge: HOME | End: 2025-08-06
Attending: FAMILY MEDICINE
Payer: COMMERCIAL

## 2025-08-06 DIAGNOSIS — R80.9 TYPE 2 DIABETES MELLITUS WITH MICROALBUMINURIA, WITH LONG-TERM CURRENT USE OF INSULIN (H): ICD-10-CM

## 2025-08-06 DIAGNOSIS — E11.29 TYPE 2 DIABETES MELLITUS WITH MICROALBUMINURIA, WITH LONG-TERM CURRENT USE OF INSULIN (H): ICD-10-CM

## 2025-08-06 DIAGNOSIS — E06.3 HYPOTHYROIDISM DUE TO HASHIMOTO THYROIDITIS: ICD-10-CM

## 2025-08-06 DIAGNOSIS — F41.1 GENERALIZED ANXIETY DISORDER: ICD-10-CM

## 2025-08-06 DIAGNOSIS — K86.2 CYST OF PANCREAS: ICD-10-CM

## 2025-08-06 DIAGNOSIS — Z79.4 TYPE 2 DIABETES MELLITUS WITH MICROALBUMINURIA, WITH LONG-TERM CURRENT USE OF INSULIN (H): ICD-10-CM

## 2025-08-06 DIAGNOSIS — E78.2 MIXED HYPERLIPIDEMIA: ICD-10-CM

## 2025-08-06 DIAGNOSIS — F10.10 ALCOHOL ABUSE: ICD-10-CM

## 2025-08-06 DIAGNOSIS — F32.1 MAJOR DEPRESSIVE DISORDER, SINGLE EPISODE, MODERATE (H): ICD-10-CM

## 2025-08-06 DIAGNOSIS — E28.2 POLYCYSTIC OVARIES: ICD-10-CM

## 2025-08-06 PROCEDURE — A9585 GADOBUTROL INJECTION: HCPCS | Performed by: FAMILY MEDICINE

## 2025-08-06 PROCEDURE — 74183 MRI ABD W/O CNTR FLWD CNTR: CPT

## 2025-08-06 PROCEDURE — 255N000002 HC RX 255 OP 636: Performed by: FAMILY MEDICINE

## 2025-08-06 RX ORDER — GADOBUTROL 604.72 MG/ML
0.1 INJECTION INTRAVENOUS ONCE
Status: COMPLETED | OUTPATIENT
Start: 2025-08-06 | End: 2025-08-06

## 2025-08-06 RX ADMIN — GADOBUTROL 9 ML: 604.72 INJECTION INTRAVENOUS at 19:34

## 2025-08-13 ENCOUNTER — OFFICE VISIT (OUTPATIENT)
Dept: URGENT CARE | Facility: URGENT CARE | Age: 45
End: 2025-08-13
Payer: COMMERCIAL

## 2025-08-13 VITALS
RESPIRATION RATE: 18 BRPM | TEMPERATURE: 98.6 F | OXYGEN SATURATION: 98 % | HEART RATE: 85 BPM | DIASTOLIC BLOOD PRESSURE: 93 MMHG | SYSTOLIC BLOOD PRESSURE: 158 MMHG

## 2025-08-13 DIAGNOSIS — E06.3 HYPOTHYROIDISM DUE TO HASHIMOTO THYROIDITIS: ICD-10-CM

## 2025-08-13 DIAGNOSIS — N30.00 ACUTE CYSTITIS WITHOUT HEMATURIA: Primary | ICD-10-CM

## 2025-08-13 LAB
ALBUMIN UR-MCNC: NEGATIVE MG/DL
APPEARANCE UR: CLEAR
BACTERIA #/AREA URNS HPF: ABNORMAL /HPF
BILIRUB UR QL STRIP: NEGATIVE
COLOR UR AUTO: YELLOW
GLUCOSE UR STRIP-MCNC: NEGATIVE MG/DL
HGB UR QL STRIP: ABNORMAL
KETONES UR STRIP-MCNC: NEGATIVE MG/DL
LEUKOCYTE ESTERASE UR QL STRIP: ABNORMAL
NITRATE UR QL: POSITIVE
PH UR STRIP: 5.5 [PH] (ref 5–8)
RBC #/AREA URNS AUTO: ABNORMAL /HPF
SP GR UR STRIP: <=1.005 (ref 1–1.03)
SQUAMOUS #/AREA URNS AUTO: ABNORMAL /LPF
UROBILINOGEN UR STRIP-ACNC: 0.2 E.U./DL
WBC #/AREA URNS AUTO: ABNORMAL /HPF

## 2025-08-13 PROCEDURE — 99213 OFFICE O/P EST LOW 20 MIN: CPT | Performed by: PHYSICIAN ASSISTANT

## 2025-08-13 PROCEDURE — 3077F SYST BP >= 140 MM HG: CPT | Performed by: PHYSICIAN ASSISTANT

## 2025-08-13 PROCEDURE — 81001 URINALYSIS AUTO W/SCOPE: CPT | Performed by: PHYSICIAN ASSISTANT

## 2025-08-13 PROCEDURE — 3080F DIAST BP >= 90 MM HG: CPT | Performed by: PHYSICIAN ASSISTANT

## 2025-08-13 RX ORDER — LEVOTHYROXINE SODIUM 150 UG/1
150 TABLET ORAL DAILY
Qty: 90 TABLET | Refills: 1 | OUTPATIENT
Start: 2025-08-13

## 2025-08-13 RX ORDER — SULFAMETHOXAZOLE AND TRIMETHOPRIM 800; 160 MG/1; MG/1
1 TABLET ORAL 2 TIMES DAILY
Qty: 6 TABLET | Refills: 0 | Status: SHIPPED | OUTPATIENT
Start: 2025-08-13 | End: 2025-08-16

## 2025-08-14 LAB
BACTERIA UR CULT: ABNORMAL
BACTERIA UR CULT: ABNORMAL

## 2025-08-28 ENCOUNTER — OFFICE VISIT (OUTPATIENT)
Dept: FAMILY MEDICINE | Facility: CLINIC | Age: 45
End: 2025-08-28
Payer: COMMERCIAL

## 2025-08-28 VITALS
OXYGEN SATURATION: 98 % | WEIGHT: 209 LBS | SYSTOLIC BLOOD PRESSURE: 138 MMHG | TEMPERATURE: 98 F | HEART RATE: 75 BPM | DIASTOLIC BLOOD PRESSURE: 80 MMHG | RESPIRATION RATE: 18 BRPM | HEIGHT: 67 IN | BODY MASS INDEX: 32.8 KG/M2

## 2025-08-28 DIAGNOSIS — F32.1 MAJOR DEPRESSIVE DISORDER, SINGLE EPISODE, MODERATE (H): ICD-10-CM

## 2025-08-28 DIAGNOSIS — R41.3 MEMORY LOSS: ICD-10-CM

## 2025-08-28 DIAGNOSIS — E11.29 TYPE 2 DIABETES MELLITUS WITH MICROALBUMINURIA, WITH LONG-TERM CURRENT USE OF INSULIN (H): Primary | ICD-10-CM

## 2025-08-28 DIAGNOSIS — E06.3 HYPOTHYROIDISM DUE TO HASHIMOTO THYROIDITIS: ICD-10-CM

## 2025-08-28 DIAGNOSIS — Z79.4 TYPE 2 DIABETES MELLITUS WITH MICROALBUMINURIA, WITH LONG-TERM CURRENT USE OF INSULIN (H): Primary | ICD-10-CM

## 2025-08-28 DIAGNOSIS — F41.1 GENERALIZED ANXIETY DISORDER: ICD-10-CM

## 2025-08-28 DIAGNOSIS — R80.9 TYPE 2 DIABETES MELLITUS WITH MICROALBUMINURIA, WITH LONG-TERM CURRENT USE OF INSULIN (H): Primary | ICD-10-CM

## 2025-08-28 LAB
ALBUMIN SERPL BCG-MCNC: 4.3 G/DL (ref 3.5–5.2)
ALP SERPL-CCNC: 48 U/L (ref 40–150)
ALT SERPL W P-5'-P-CCNC: 39 U/L (ref 0–50)
ANION GAP SERPL CALCULATED.3IONS-SCNC: 8 MMOL/L (ref 7–15)
AST SERPL W P-5'-P-CCNC: 18 U/L (ref 0–45)
BILIRUB SERPL-MCNC: 0.7 MG/DL
BUN SERPL-MCNC: 18.5 MG/DL (ref 6–20)
CALCIUM SERPL-MCNC: 9.8 MG/DL (ref 8.8–10.4)
CHLORIDE SERPL-SCNC: 102 MMOL/L (ref 98–107)
CHOLEST SERPL-MCNC: 244 MG/DL
CREAT SERPL-MCNC: 0.89 MG/DL (ref 0.51–0.95)
EGFRCR SERPLBLD CKD-EPI 2021: 80 ML/MIN/1.73M2
EST. AVERAGE GLUCOSE BLD GHB EST-MCNC: 114 MG/DL
FASTING STATUS PATIENT QL REPORTED: YES
GLUCOSE SERPL-MCNC: 139 MG/DL (ref 70–99)
HBA1C MFR BLD: 5.6 % (ref 0–5.6)
HCO3 SERPL-SCNC: 26 MMOL/L (ref 22–29)
HDLC SERPL-MCNC: 49 MG/DL
HOLD SPECIMEN: NORMAL
HOLD SPECIMEN: NORMAL
LDLC SERPL CALC-MCNC: 167 MG/DL
NONHDLC SERPL-MCNC: 195 MG/DL
POTASSIUM SERPL-SCNC: 4.7 MMOL/L (ref 3.4–5.3)
PROT SERPL-MCNC: 7 G/DL (ref 6.4–8.3)
SODIUM SERPL-SCNC: 136 MMOL/L (ref 135–145)
TRIGL SERPL-MCNC: 139 MG/DL
TSH SERPL DL<=0.005 MIU/L-ACNC: 0.74 UIU/ML (ref 0.3–4.2)
VIT B12 SERPL-MCNC: 610 PG/ML (ref 232–1245)
VIT D+METAB SERPL-MCNC: 32 NG/ML (ref 20–50)

## 2025-08-28 RX ORDER — VENLAFAXINE HYDROCHLORIDE 37.5 MG/1
CAPSULE, EXTENDED RELEASE ORAL
Qty: 60 CAPSULE | Refills: 1 | Status: SHIPPED | OUTPATIENT
Start: 2025-08-28

## 2025-08-28 ASSESSMENT — PATIENT HEALTH QUESTIONNAIRE - PHQ9
SUM OF ALL RESPONSES TO PHQ QUESTIONS 1-9: 16
10. IF YOU CHECKED OFF ANY PROBLEMS, HOW DIFFICULT HAVE THESE PROBLEMS MADE IT FOR YOU TO DO YOUR WORK, TAKE CARE OF THINGS AT HOME, OR GET ALONG WITH OTHER PEOPLE: EXTREMELY DIFFICULT
SUM OF ALL RESPONSES TO PHQ QUESTIONS 1-9: 16

## 2025-08-28 ASSESSMENT — ANXIETY QUESTIONNAIRES
5. BEING SO RESTLESS THAT IT IS HARD TO SIT STILL: NEARLY EVERY DAY
GAD7 TOTAL SCORE: 21
2. NOT BEING ABLE TO STOP OR CONTROL WORRYING: NEARLY EVERY DAY
IF YOU CHECKED OFF ANY PROBLEMS ON THIS QUESTIONNAIRE, HOW DIFFICULT HAVE THESE PROBLEMS MADE IT FOR YOU TO DO YOUR WORK, TAKE CARE OF THINGS AT HOME, OR GET ALONG WITH OTHER PEOPLE: EXTREMELY DIFFICULT
6. BECOMING EASILY ANNOYED OR IRRITABLE: NEARLY EVERY DAY
GAD7 TOTAL SCORE: 21
1. FEELING NERVOUS, ANXIOUS, OR ON EDGE: NEARLY EVERY DAY
8. IF YOU CHECKED OFF ANY PROBLEMS, HOW DIFFICULT HAVE THESE MADE IT FOR YOU TO DO YOUR WORK, TAKE CARE OF THINGS AT HOME, OR GET ALONG WITH OTHER PEOPLE?: EXTREMELY DIFFICULT
4. TROUBLE RELAXING: NEARLY EVERY DAY
3. WORRYING TOO MUCH ABOUT DIFFERENT THINGS: NEARLY EVERY DAY
7. FEELING AFRAID AS IF SOMETHING AWFUL MIGHT HAPPEN: NEARLY EVERY DAY
7. FEELING AFRAID AS IF SOMETHING AWFUL MIGHT HAPPEN: NEARLY EVERY DAY
GAD7 TOTAL SCORE: 21

## 2025-08-28 ASSESSMENT — PAIN SCALES - GENERAL: PAINLEVEL_OUTOF10: NO PAIN (0)

## 2025-08-31 LAB — VIT B1 PYROPHOSHATE BLD-SCNC: 97 NMOL/L

## 2025-09-01 ENCOUNTER — PATIENT OUTREACH (OUTPATIENT)
Dept: CARE COORDINATION | Facility: CLINIC | Age: 45
End: 2025-09-01
Payer: COMMERCIAL